# Patient Record
Sex: MALE | Race: WHITE | Employment: OTHER | URBAN - METROPOLITAN AREA
[De-identification: names, ages, dates, MRNs, and addresses within clinical notes are randomized per-mention and may not be internally consistent; named-entity substitution may affect disease eponyms.]

---

## 2018-02-12 ENCOUNTER — APPOINTMENT (OUTPATIENT)
Dept: CT IMAGING | Age: 53
DRG: 419 | End: 2018-02-12
Payer: MEDICARE

## 2018-02-12 ENCOUNTER — APPOINTMENT (OUTPATIENT)
Dept: GENERAL RADIOLOGY | Age: 53
DRG: 419 | End: 2018-02-12
Payer: MEDICARE

## 2018-02-12 ENCOUNTER — APPOINTMENT (OUTPATIENT)
Dept: ULTRASOUND IMAGING | Age: 53
DRG: 419 | End: 2018-02-12
Payer: MEDICARE

## 2018-02-12 ENCOUNTER — HOSPITAL ENCOUNTER (INPATIENT)
Age: 53
LOS: 2 days | Discharge: HOME | DRG: 419 | End: 2018-02-14
Attending: EMERGENCY MEDICINE | Admitting: INTERNAL MEDICINE
Payer: MEDICARE

## 2018-02-12 ENCOUNTER — APPOINTMENT (OUTPATIENT)
Dept: NUCLEAR MEDICINE | Age: 53
DRG: 419 | End: 2018-02-12
Payer: MEDICARE

## 2018-02-12 DIAGNOSIS — Z90.49 S/P CHOLE: ICD-10-CM

## 2018-02-12 DIAGNOSIS — R07.9 CHEST PAIN, UNSPECIFIED TYPE: Primary | ICD-10-CM

## 2018-02-12 DIAGNOSIS — K80.20 GALLSTONES: ICD-10-CM

## 2018-02-12 DIAGNOSIS — K85.91 ACUTE PANCREATITIS WITH UNINFECTED NECROSIS, UNSPECIFIED PANCREATITIS TYPE: ICD-10-CM

## 2018-02-12 PROBLEM — Z95.0 PACEMAKER: Status: ACTIVE | Noted: 2018-02-12

## 2018-02-12 PROBLEM — I10 HYPERTENSION: Status: ACTIVE | Noted: 2018-02-12

## 2018-02-12 PROBLEM — R10.9 ABDOMINAL PAIN: Status: ACTIVE | Noted: 2018-02-12

## 2018-02-12 LAB
ABSOLUTE EOS #: 0.1 K/UL (ref 0–0.4)
ABSOLUTE IMMATURE GRANULOCYTE: ABNORMAL K/UL (ref 0–0.3)
ABSOLUTE LYMPH #: 1.1 K/UL (ref 1–4.8)
ABSOLUTE MONO #: 0.2 K/UL (ref 0.1–1.3)
ALBUMIN SERPL-MCNC: 4.4 G/DL (ref 3.5–5.2)
ALBUMIN/GLOBULIN RATIO: ABNORMAL (ref 1–2.5)
ALP BLD-CCNC: 99 U/L (ref 40–129)
ALT SERPL-CCNC: 29 U/L (ref 5–41)
ANION GAP SERPL CALCULATED.3IONS-SCNC: 12 MMOL/L (ref 9–17)
AST SERPL-CCNC: 24 U/L
BASOPHILS # BLD: 1 % (ref 0–2)
BASOPHILS ABSOLUTE: 0.1 K/UL (ref 0–0.2)
BILIRUB SERPL-MCNC: 0.34 MG/DL (ref 0.3–1.2)
BUN BLDV-MCNC: 12 MG/DL (ref 6–20)
BUN/CREAT BLD: ABNORMAL (ref 9–20)
CALCIUM SERPL-MCNC: 9.2 MG/DL (ref 8.6–10.4)
CHLORIDE BLD-SCNC: 102 MMOL/L (ref 98–107)
CO2: 26 MMOL/L (ref 20–31)
CREAT SERPL-MCNC: 1.01 MG/DL (ref 0.7–1.2)
DIFFERENTIAL TYPE: ABNORMAL
EKG ATRIAL RATE: 74 BPM
EKG P AXIS: 42 DEGREES
EKG P-R INTERVAL: 176 MS
EKG Q-T INTERVAL: 430 MS
EKG QRS DURATION: 98 MS
EKG QTC CALCULATION (BAZETT): 477 MS
EKG R AXIS: 55 DEGREES
EKG T AXIS: 40 DEGREES
EKG VENTRICULAR RATE: 74 BPM
EOSINOPHILS RELATIVE PERCENT: 1 % (ref 0–4)
ESTIMATED AVERAGE GLUCOSE: 97 MG/DL
GFR AFRICAN AMERICAN: >60 ML/MIN
GFR NON-AFRICAN AMERICAN: >60 ML/MIN
GFR SERPL CREATININE-BSD FRML MDRD: ABNORMAL ML/MIN/{1.73_M2}
GFR SERPL CREATININE-BSD FRML MDRD: ABNORMAL ML/MIN/{1.73_M2}
GLUCOSE BLD-MCNC: 182 MG/DL (ref 70–99)
HBA1C MFR BLD: 5 % (ref 4–6)
HCT VFR BLD CALC: 46.9 % (ref 41–53)
HEMOGLOBIN: 15.7 G/DL (ref 13.5–17.5)
IMMATURE GRANULOCYTES: ABNORMAL %
INR BLD: 1
LIPASE: 103 U/L (ref 13–60)
LV EF: 55 %
LVEF MODALITY: NORMAL
LYMPHOCYTES # BLD: 15 % (ref 24–44)
MAGNESIUM: 2 MG/DL (ref 1.6–2.6)
MCH RBC QN AUTO: 31.2 PG (ref 26–34)
MCHC RBC AUTO-ENTMCNC: 33.6 G/DL (ref 31–37)
MCV RBC AUTO: 92.9 FL (ref 80–100)
MONOCYTES # BLD: 3 % (ref 1–7)
NRBC AUTOMATED: ABNORMAL PER 100 WBC
PDW BLD-RTO: 13 % (ref 11.5–14.9)
PLATELET # BLD: 245 K/UL (ref 150–450)
PLATELET ESTIMATE: ABNORMAL
PMV BLD AUTO: 8 FL (ref 6–12)
POTASSIUM SERPL-SCNC: 4.2 MMOL/L (ref 3.7–5.3)
PROTHROMBIN TIME: 10.5 SEC (ref 9.7–12)
RBC # BLD: 5.04 M/UL (ref 4.5–5.9)
RBC # BLD: ABNORMAL 10*6/UL
SEG NEUTROPHILS: 80 % (ref 36–66)
SEGMENTED NEUTROPHILS ABSOLUTE COUNT: 5.8 K/UL (ref 1.3–9.1)
SODIUM BLD-SCNC: 140 MMOL/L (ref 135–144)
TOTAL PROTEIN: 7.7 G/DL (ref 6.4–8.3)
TROPONIN INTERP: NORMAL
TROPONIN T: <0.03 NG/ML
WBC # BLD: 7.3 K/UL (ref 3.5–11)
WBC # BLD: ABNORMAL 10*3/UL

## 2018-02-12 PROCEDURE — A9537 TC99M MEBROFENIN: HCPCS | Performed by: EMERGENCY MEDICINE

## 2018-02-12 PROCEDURE — 3430000000 HC RX DIAGNOSTIC RADIOPHARMACEUTICAL: Performed by: EMERGENCY MEDICINE

## 2018-02-12 PROCEDURE — 99285 EMERGENCY DEPT VISIT HI MDM: CPT

## 2018-02-12 PROCEDURE — 93005 ELECTROCARDIOGRAM TRACING: CPT

## 2018-02-12 PROCEDURE — 85025 COMPLETE CBC W/AUTO DIFF WBC: CPT

## 2018-02-12 PROCEDURE — G0378 HOSPITAL OBSERVATION PER HR: HCPCS

## 2018-02-12 PROCEDURE — 2060000000 HC ICU INTERMEDIATE R&B

## 2018-02-12 PROCEDURE — 2500000003 HC RX 250 WO HCPCS: Performed by: INTERNAL MEDICINE

## 2018-02-12 PROCEDURE — 96374 THER/PROPH/DIAG INJ IV PUSH: CPT

## 2018-02-12 PROCEDURE — 85610 PROTHROMBIN TIME: CPT

## 2018-02-12 PROCEDURE — 2580000003 HC RX 258: Performed by: SURGERY

## 2018-02-12 PROCEDURE — 2580000003 HC RX 258: Performed by: EMERGENCY MEDICINE

## 2018-02-12 PROCEDURE — 6360000002 HC RX W HCPCS: Performed by: SURGERY

## 2018-02-12 PROCEDURE — 83690 ASSAY OF LIPASE: CPT

## 2018-02-12 PROCEDURE — 83036 HEMOGLOBIN GLYCOSYLATED A1C: CPT

## 2018-02-12 PROCEDURE — 6360000002 HC RX W HCPCS: Performed by: EMERGENCY MEDICINE

## 2018-02-12 PROCEDURE — 2500000003 HC RX 250 WO HCPCS: Performed by: EMERGENCY MEDICINE

## 2018-02-12 PROCEDURE — 93306 TTE W/DOPPLER COMPLETE: CPT

## 2018-02-12 PROCEDURE — 6360000004 HC RX CONTRAST MEDICATION: Performed by: EMERGENCY MEDICINE

## 2018-02-12 PROCEDURE — 6360000002 HC RX W HCPCS: Performed by: STUDENT IN AN ORGANIZED HEALTH CARE EDUCATION/TRAINING PROGRAM

## 2018-02-12 PROCEDURE — 6360000002 HC RX W HCPCS: Performed by: RADIOLOGY

## 2018-02-12 PROCEDURE — 96375 TX/PRO/DX INJ NEW DRUG ADDON: CPT

## 2018-02-12 PROCEDURE — 74022 RADEX COMPL AQT ABD SERIES: CPT

## 2018-02-12 PROCEDURE — 74174 CTA ABD&PLVS W/CONTRAST: CPT

## 2018-02-12 PROCEDURE — 76705 ECHO EXAM OF ABDOMEN: CPT

## 2018-02-12 PROCEDURE — 71275 CT ANGIOGRAPHY CHEST: CPT

## 2018-02-12 PROCEDURE — 99223 1ST HOSP IP/OBS HIGH 75: CPT | Performed by: INTERNAL MEDICINE

## 2018-02-12 PROCEDURE — 78226 HEPATOBILIARY SYSTEM IMAGING: CPT

## 2018-02-12 PROCEDURE — 83735 ASSAY OF MAGNESIUM: CPT

## 2018-02-12 PROCEDURE — 80053 COMPREHEN METABOLIC PANEL: CPT

## 2018-02-12 PROCEDURE — 2580000003 HC RX 258: Performed by: STUDENT IN AN ORGANIZED HEALTH CARE EDUCATION/TRAINING PROGRAM

## 2018-02-12 PROCEDURE — S0028 INJECTION, FAMOTIDINE, 20 MG: HCPCS | Performed by: EMERGENCY MEDICINE

## 2018-02-12 PROCEDURE — 36415 COLL VENOUS BLD VENIPUNCTURE: CPT

## 2018-02-12 PROCEDURE — 84484 ASSAY OF TROPONIN QUANT: CPT

## 2018-02-12 RX ORDER — METOPROLOL TARTRATE 5 MG/5ML
5 INJECTION INTRAVENOUS EVERY 6 HOURS
Status: DISCONTINUED | OUTPATIENT
Start: 2018-02-12 | End: 2018-02-14

## 2018-02-12 RX ORDER — SODIUM CHLORIDE 0.9 % (FLUSH) 0.9 %
10 SYRINGE (ML) INJECTION PRN
Status: DISCONTINUED | OUTPATIENT
Start: 2018-02-12 | End: 2018-02-13 | Stop reason: SDUPTHER

## 2018-02-12 RX ORDER — M-VIT,TX,IRON,MINS/CALC/FOLIC 27MG-0.4MG
1 TABLET ORAL DAILY
COMMUNITY

## 2018-02-12 RX ORDER — SODIUM CHLORIDE 0.9 % (FLUSH) 0.9 %
10 SYRINGE (ML) INJECTION PRN
Status: DISCONTINUED | OUTPATIENT
Start: 2018-02-12 | End: 2018-02-14 | Stop reason: HOSPADM

## 2018-02-12 RX ORDER — ASPIRIN 81 MG/1
81 TABLET, CHEWABLE ORAL DAILY
Status: DISCONTINUED | OUTPATIENT
Start: 2018-02-13 | End: 2018-02-12

## 2018-02-12 RX ORDER — SODIUM CHLORIDE 0.9 % (FLUSH) 0.9 %
10 SYRINGE (ML) INJECTION EVERY 12 HOURS SCHEDULED
Status: DISCONTINUED | OUTPATIENT
Start: 2018-02-12 | End: 2018-02-14 | Stop reason: HOSPADM

## 2018-02-12 RX ORDER — ACETAMINOPHEN 325 MG/1
650 TABLET ORAL EVERY 4 HOURS PRN
Status: DISCONTINUED | OUTPATIENT
Start: 2018-02-12 | End: 2018-02-14 | Stop reason: HOSPADM

## 2018-02-12 RX ORDER — ONDANSETRON 2 MG/ML
8 INJECTION INTRAMUSCULAR; INTRAVENOUS ONCE
Status: COMPLETED | OUTPATIENT
Start: 2018-02-12 | End: 2018-02-12

## 2018-02-12 RX ORDER — MORPHINE SULFATE 4 MG/ML
4 INJECTION, SOLUTION INTRAMUSCULAR; INTRAVENOUS ONCE
Status: COMPLETED | OUTPATIENT
Start: 2018-02-12 | End: 2018-02-12

## 2018-02-12 RX ORDER — FENTANYL CITRATE 50 UG/ML
50 INJECTION, SOLUTION INTRAMUSCULAR; INTRAVENOUS ONCE
Status: COMPLETED | OUTPATIENT
Start: 2018-02-12 | End: 2018-02-12

## 2018-02-12 RX ORDER — 0.9 % SODIUM CHLORIDE 0.9 %
100 INTRAVENOUS SOLUTION INTRAVENOUS ONCE
Status: COMPLETED | OUTPATIENT
Start: 2018-02-12 | End: 2018-02-12

## 2018-02-12 RX ORDER — ONDANSETRON 2 MG/ML
4 INJECTION INTRAMUSCULAR; INTRAVENOUS EVERY 6 HOURS PRN
Status: DISCONTINUED | OUTPATIENT
Start: 2018-02-12 | End: 2018-02-13 | Stop reason: SDUPTHER

## 2018-02-12 RX ORDER — ACETAMINOPHEN 500 MG
1000 TABLET ORAL ONCE
Status: DISCONTINUED | OUTPATIENT
Start: 2018-02-12 | End: 2018-02-12

## 2018-02-12 RX ADMIN — MORPHINE SULFATE 4 MG: 4 INJECTION, SOLUTION INTRAMUSCULAR; INTRAVENOUS at 14:42

## 2018-02-12 RX ADMIN — ENOXAPARIN SODIUM 30 MG: 30 INJECTION SUBCUTANEOUS at 17:01

## 2018-02-12 RX ADMIN — FENTANYL CITRATE 50 MCG: 50 INJECTION, SOLUTION INTRAMUSCULAR; INTRAVENOUS at 10:18

## 2018-02-12 RX ADMIN — METOPROLOL TARTRATE 5 MG: 5 INJECTION, SOLUTION INTRAVENOUS at 23:29

## 2018-02-12 RX ADMIN — ONDANSETRON 8 MG: 2 INJECTION INTRAMUSCULAR; INTRAVENOUS at 09:41

## 2018-02-12 RX ADMIN — PIPERACILLIN SODIUM,TAZOBACTAM SODIUM 3.38 G: 3; .375 INJECTION, POWDER, FOR SOLUTION INTRAVENOUS at 20:16

## 2018-02-12 RX ADMIN — IOPAMIDOL 100 ML: 755 INJECTION, SOLUTION INTRAVENOUS at 09:09

## 2018-02-12 RX ADMIN — Medication 10 ML: at 09:09

## 2018-02-12 RX ADMIN — Medication 4.8 MILLICURIE: at 13:20

## 2018-02-12 RX ADMIN — METOPROLOL TARTRATE 5 MG: 5 INJECTION, SOLUTION INTRAVENOUS at 17:00

## 2018-02-12 RX ADMIN — FAMOTIDINE 20 MG: 10 INJECTION, SOLUTION INTRAVENOUS at 09:41

## 2018-02-12 RX ADMIN — Medication 10 ML: at 17:01

## 2018-02-12 RX ADMIN — Medication 10 ML: at 14:40

## 2018-02-12 RX ADMIN — Medication 2.1 MILLICURIE: at 14:40

## 2018-02-12 RX ADMIN — SODIUM CHLORIDE 100 ML: 9 INJECTION, SOLUTION INTRAVENOUS at 09:09

## 2018-02-12 RX ADMIN — Medication 10 ML: at 20:17

## 2018-02-12 RX ADMIN — Medication 10 ML: at 13:20

## 2018-02-12 ASSESSMENT — ENCOUNTER SYMPTOMS
APNEA: 0
COUGH: 1
BLOOD IN STOOL: 0
CONSTIPATION: 0
ABDOMINAL PAIN: 0
ABDOMINAL PAIN: 1
CHOKING: 0
COUGH: 0
STRIDOR: 0
SHORTNESS OF BREATH: 0
ORTHOPNEA: 0
CHEST TIGHTNESS: 0
DIARRHEA: 0
SPUTUM PRODUCTION: 1
VOMITING: 1
WHEEZING: 0
NAUSEA: 1

## 2018-02-12 ASSESSMENT — PAIN DESCRIPTION - LOCATION: LOCATION: CHEST

## 2018-02-12 ASSESSMENT — PAIN SCALES - GENERAL
PAINLEVEL_OUTOF10: 7
PAINLEVEL_OUTOF10: 4
PAINLEVEL_OUTOF10: 4

## 2018-02-12 NOTE — CONSULTS
[facility])            Consult        Date of Admission:  2/12/2018  Date of Consultation:  2/12/2018      PCP:  Ish Lennon CNP      Reason for the consult:  Chest pain and cardiac risk assessment prior to cholecystectomy    History of Present Illness:  Ruddy Duffy is a 45 y.o. male  with history of mitral valve disease status post mitral valve replacement. Status post permanent pacemaker. History of hypertension. Patient have a follow-up with our office since 2015 he have not had any pacemaker check R echocardiogram recently. He was admitted with epigastric and lower chest pains he describes it as sharp for his assistant improved with morphine relation to any exertion no radiation. PMH:   has a past medical history of Hypertension and Sepsis ([address] PSH:   has a past surgical history that includes Cardiac pacemaker placement and Cardiac valve replacement. Allergies: Allergies   Allergen Reactions    Compazine [Prochlorperazine Maleate] Other (See Comments)     Unknown reaction. Positive reaction during allergy scratch testing.          Home Meds:    Prior to Admission medications    Not on File        Hospital Meds:    Current Facility-Administered Medications   Medication Dose Route Frequency Provider Last Rate Last Dose    sodium chloride flush 0.9 % injection 10 mL  10 mL Intravenous PRN Jeannie Melendez MD   10 mL at 02/12/18 0909    sodium chloride flush 0.9 % injection 10 mL  10 mL Intravenous 2 times per day Luis Armando Stern MD        sodium chloride flush 0.9 % injection 10 mL  10 mL Intravenous PRN Luis Armando Stern MD        acetaminophen (TYLENOL) tablet 650 mg  650 mg Oral Q4H PRN Luis Armando Stern MD        magnesium hydroxide (MILK OF MAGNESIA) 400 MG/5ML suspension 30 mL  30 mL Oral Daily PRN Luis Armando Stern MD        enoxaparin (LOVENOX) injection 30 mg  30 mg Subcutaneous BID Luis Armando Stern MD        ondansetron [facility]) injection 4 mg  4 mg Intravenous

## 2018-02-12 NOTE — H&P
for 4 hours FINDINGS: LIVER:  Diffuse heterogeneous increased echogenicity throughout the hepatic parenchyma which most likely represents diffuse fatty infiltration of the liver. There is probable focal fatty sparing adjacent to the gallbladder in the gallbladder fossa on image 12. BILIARY SYSTEM:  Gallbladder is unremarkable without evidence of pericholecystic fluid, wall thickening or stones. Negative sonographic Gallardo's sign. Gallbladder wall thickness is normal measuring 2 mm. Common bile duct is within normal limits measuring up to 5 mm. RIGHT KIDNEY: Partial visualization of the right kidney demonstrates no gross right-sided hydronephrosis. PANCREAS:  Pancreas not well visualized. OTHER: No evidence of right upper quadrant ascites. 1. Diffuse fatty infiltration of the liver with probable focal fatty sparing adjacent to the gallbladder in the gallbladder fossa. 2. Otherwise, unremarkable right upper quadrant abdominal ultrasound. Cta Chest W Contrast  Cta Abdomen Pelvis W Contrast  Result Date: 2/12/2018  EXAMINATION: CTA OF THE CHEST; CTA OF THE ABDOMEN AND PELVIS WITH AND WITHOUT CONTRAST 2/12/2018 9:13 am TECHNIQUE: CTA of the chest was performed after the administration of intravenous contrast.  Multiplanar reformatted images are provided for review. MIP images are provided for review. Dose modulation, iterative reconstruction, and/or weight based adjustment of the mA/kV was utilized to reduce the radiation dose to as low as reasonably achievable.; CTA of the abdomen and pelvis was performed without and with the administration of intravenous contrast. Multiplanar reformatted images are provided for review. MIP images are provided for review. Dose modulation, iterative reconstruction, and/or weight based adjustment of the mA/kV was utilized to reduce the radiation dose to as low as reasonably achievable. 3D reconstructed images were performed on a separate workstation and provided for review. Problem  Chest pain    Active Hospital Problems    Diagnosis Date Noted    Chest pain [R07.9] 02/12/2018    Hypertension [I10] 02/12/2018    Pacemaker [Z95.0] 02/12/2018    Abdominal pain [R10.9] 02/12/2018    Symptomatic cholelithiasis [K80.20] 02/12/2018     Plan:     Patient status Admit as inpatient in the  Progressive Unit/Step down    Chest pain - atypical likely related to gallstones  - Trop neg x 2 ; trend  - ASA , BB  - consider cardiology consult if unresolved  - f/u hgb A1c, lipid panel to assess risk  - consider stress test   - hx of prosthetic valve, PM  - continue to monitor     Symptomatic cholelithiasis / Abdominal Pain  - f/u HIDA  - CT showing 1.7cm stone at neck of gallbladder with dilation  - consult to general surgery - await recs  - NPO, ice chips     Hypertension  - patient reports poor medicaiton compliance  - start BB  - continue to monitor BP    DVT prophylaxis: Lovenox 40 mg SC  GI prophylaxis: reason for no GI prophylaxis: defer    Consultations:   IP CONSULT TO GENERAL SURGERY  IP CONSULT TO SOCIAL WORK    Patient is admitted as inpatient status because of co-morbidities listed above, severity of signs and symptoms as outlined, requirement for current medical therapies and most importantly because of direct risk to patient if care not provided in a hospital setting. Michelet Torres MD  2/12/2018  11:52 AM    Copy sent to Dr. Linwood Hamlin CNP      IM Attending    Pt seen and examined today   I have discussed the care of pt , including pertinent history and exam findings,  with the resident. I have reviewed the key elements of all parts of the encounter with the resident. I agree with the assessment, plan and orders as documented by the resident.     Acute cholecystitis   Sx consult    Electronically signed by Les Brown MD on 2/12/2018 at 11:33 PM

## 2018-02-13 ENCOUNTER — ANESTHESIA (OUTPATIENT)
Dept: OPERATING ROOM | Age: 53
DRG: 419 | End: 2018-02-13
Payer: MEDICARE

## 2018-02-13 ENCOUNTER — ANESTHESIA EVENT (OUTPATIENT)
Dept: OPERATING ROOM | Age: 53
DRG: 419 | End: 2018-02-13
Payer: MEDICARE

## 2018-02-13 VITALS — TEMPERATURE: 97.7 F | DIASTOLIC BLOOD PRESSURE: 73 MMHG | SYSTOLIC BLOOD PRESSURE: 123 MMHG | OXYGEN SATURATION: 95 %

## 2018-02-13 LAB
ABSOLUTE EOS #: 0.2 K/UL (ref 0–0.4)
ABSOLUTE IMMATURE GRANULOCYTE: ABNORMAL K/UL (ref 0–0.3)
ABSOLUTE LYMPH #: 1.4 K/UL (ref 1–4.8)
ABSOLUTE MONO #: 0.5 K/UL (ref 0.1–1.3)
ALBUMIN SERPL-MCNC: 3.8 G/DL (ref 3.5–5.2)
ALBUMIN/GLOBULIN RATIO: NORMAL (ref 1–2.5)
ALP BLD-CCNC: 79 U/L (ref 40–129)
ALT SERPL-CCNC: 24 U/L (ref 5–41)
ANION GAP SERPL CALCULATED.3IONS-SCNC: 8 MMOL/L (ref 9–17)
AST SERPL-CCNC: 21 U/L
BASOPHILS # BLD: 1 % (ref 0–2)
BASOPHILS ABSOLUTE: 0 K/UL (ref 0–0.2)
BILIRUB SERPL-MCNC: 0.68 MG/DL (ref 0.3–1.2)
BILIRUBIN DIRECT: 0.18 MG/DL
BILIRUBIN, INDIRECT: 0.5 MG/DL (ref 0–1)
BUN BLDV-MCNC: 13 MG/DL (ref 6–20)
BUN/CREAT BLD: ABNORMAL (ref 9–20)
CALCIUM SERPL-MCNC: 8.8 MG/DL (ref 8.6–10.4)
CHLORIDE BLD-SCNC: 101 MMOL/L (ref 98–107)
CHOLESTEROL/HDL RATIO: 5.9
CHOLESTEROL: 190 MG/DL
CO2: 32 MMOL/L (ref 20–31)
CREAT SERPL-MCNC: 1.22 MG/DL (ref 0.7–1.2)
DIFFERENTIAL TYPE: ABNORMAL
EOSINOPHILS RELATIVE PERCENT: 3 % (ref 0–4)
GFR AFRICAN AMERICAN: >60 ML/MIN
GFR NON-AFRICAN AMERICAN: >60 ML/MIN
GFR SERPL CREATININE-BSD FRML MDRD: ABNORMAL ML/MIN/{1.73_M2}
GFR SERPL CREATININE-BSD FRML MDRD: ABNORMAL ML/MIN/{1.73_M2}
GLOBULIN: NORMAL G/DL (ref 1.5–3.8)
GLUCOSE BLD-MCNC: 104 MG/DL (ref 70–99)
HCT VFR BLD CALC: 41.9 % (ref 41–53)
HDLC SERPL-MCNC: 32 MG/DL
HEMOGLOBIN: 14.4 G/DL (ref 13.5–17.5)
IMMATURE GRANULOCYTES: ABNORMAL %
LDL CHOLESTEROL: 119 MG/DL (ref 0–130)
LIPASE: 35 U/L (ref 13–60)
LYMPHOCYTES # BLD: 20 % (ref 24–44)
MAGNESIUM: 1.9 MG/DL (ref 1.6–2.6)
MCH RBC QN AUTO: 31.9 PG (ref 26–34)
MCHC RBC AUTO-ENTMCNC: 34.4 G/DL (ref 31–37)
MCV RBC AUTO: 92.8 FL (ref 80–100)
MONOCYTES # BLD: 8 % (ref 1–7)
NRBC AUTOMATED: ABNORMAL PER 100 WBC
PDW BLD-RTO: 12.9 % (ref 11.5–14.9)
PLATELET # BLD: 212 K/UL (ref 150–450)
PLATELET ESTIMATE: ABNORMAL
PMV BLD AUTO: 8.2 FL (ref 6–12)
POTASSIUM SERPL-SCNC: 4 MMOL/L (ref 3.7–5.3)
RBC # BLD: 4.51 M/UL (ref 4.5–5.9)
RBC # BLD: ABNORMAL 10*6/UL
SEG NEUTROPHILS: 68 % (ref 36–66)
SEGMENTED NEUTROPHILS ABSOLUTE COUNT: 4.9 K/UL (ref 1.3–9.1)
SODIUM BLD-SCNC: 141 MMOL/L (ref 135–144)
TOTAL PROTEIN: 6.6 G/DL (ref 6.4–8.3)
TRIGL SERPL-MCNC: 197 MG/DL
VLDLC SERPL CALC-MCNC: ABNORMAL MG/DL (ref 1–30)
WBC # BLD: 7.2 K/UL (ref 3.5–11)
WBC # BLD: ABNORMAL 10*3/UL

## 2018-02-13 PROCEDURE — 83690 ASSAY OF LIPASE: CPT

## 2018-02-13 PROCEDURE — 6360000002 HC RX W HCPCS: Performed by: INTERNAL MEDICINE

## 2018-02-13 PROCEDURE — 2500000003 HC RX 250 WO HCPCS: Performed by: ANESTHESIOLOGY

## 2018-02-13 PROCEDURE — 3700000000 HC ANESTHESIA ATTENDED CARE: Performed by: SURGERY

## 2018-02-13 PROCEDURE — 2580000003 HC RX 258: Performed by: ANESTHESIOLOGY

## 2018-02-13 PROCEDURE — 88304 TISSUE EXAM BY PATHOLOGIST: CPT

## 2018-02-13 PROCEDURE — 2500000003 HC RX 250 WO HCPCS: Performed by: SURGERY

## 2018-02-13 PROCEDURE — A6402 STERILE GAUZE <= 16 SQ IN: HCPCS | Performed by: SURGERY

## 2018-02-13 PROCEDURE — 36415 COLL VENOUS BLD VENIPUNCTURE: CPT

## 2018-02-13 PROCEDURE — 85025 COMPLETE CBC W/AUTO DIFF WBC: CPT

## 2018-02-13 PROCEDURE — 80076 HEPATIC FUNCTION PANEL: CPT

## 2018-02-13 PROCEDURE — 0FT44ZZ RESECTION OF GALLBLADDER, PERCUTANEOUS ENDOSCOPIC APPROACH: ICD-10-PCS | Performed by: SURGERY

## 2018-02-13 PROCEDURE — 7100000000 HC PACU RECOVERY - FIRST 15 MIN: Performed by: SURGERY

## 2018-02-13 PROCEDURE — 6370000000 HC RX 637 (ALT 250 FOR IP): Performed by: STUDENT IN AN ORGANIZED HEALTH CARE EDUCATION/TRAINING PROGRAM

## 2018-02-13 PROCEDURE — 83735 ASSAY OF MAGNESIUM: CPT

## 2018-02-13 PROCEDURE — 3600000019 HC SURGERY ROBOT ADDTL 15MIN: Performed by: SURGERY

## 2018-02-13 PROCEDURE — 6360000002 HC RX W HCPCS: Performed by: SURGERY

## 2018-02-13 PROCEDURE — 6360000002 HC RX W HCPCS: Performed by: STUDENT IN AN ORGANIZED HEALTH CARE EDUCATION/TRAINING PROGRAM

## 2018-02-13 PROCEDURE — 2500000003 HC RX 250 WO HCPCS: Performed by: INTERNAL MEDICINE

## 2018-02-13 PROCEDURE — 3700000001 HC ADD 15 MINUTES (ANESTHESIA): Performed by: SURGERY

## 2018-02-13 PROCEDURE — 2580000003 HC RX 258: Performed by: SURGERY

## 2018-02-13 PROCEDURE — S2900 ROBOTIC SURGICAL SYSTEM: HCPCS | Performed by: SURGERY

## 2018-02-13 PROCEDURE — 7100000001 HC PACU RECOVERY - ADDTL 15 MIN: Performed by: SURGERY

## 2018-02-13 PROCEDURE — 2060000000 HC ICU INTERMEDIATE R&B

## 2018-02-13 PROCEDURE — 2580000003 HC RX 258: Performed by: STUDENT IN AN ORGANIZED HEALTH CARE EDUCATION/TRAINING PROGRAM

## 2018-02-13 PROCEDURE — 6360000002 HC RX W HCPCS: Performed by: ANESTHESIOLOGY

## 2018-02-13 PROCEDURE — 99233 SBSQ HOSP IP/OBS HIGH 50: CPT | Performed by: INTERNAL MEDICINE

## 2018-02-13 PROCEDURE — 80061 LIPID PANEL: CPT

## 2018-02-13 PROCEDURE — 3600000009 HC SURGERY ROBOT BASE: Performed by: SURGERY

## 2018-02-13 PROCEDURE — 8E0W4CZ ROBOTIC ASSISTED PROCEDURE OF TRUNK REGION, PERCUTANEOUS ENDOSCOPIC APPROACH: ICD-10-PCS | Performed by: SURGERY

## 2018-02-13 PROCEDURE — 80048 BASIC METABOLIC PNL TOTAL CA: CPT

## 2018-02-13 RX ORDER — FENTANYL CITRATE 50 UG/ML
25 INJECTION, SOLUTION INTRAMUSCULAR; INTRAVENOUS
Status: DISCONTINUED | OUTPATIENT
Start: 2018-02-13 | End: 2018-02-13 | Stop reason: SDUPTHER

## 2018-02-13 RX ORDER — FENTANYL CITRATE 50 UG/ML
25 INJECTION, SOLUTION INTRAMUSCULAR; INTRAVENOUS EVERY 5 MIN PRN
Status: DISCONTINUED | OUTPATIENT
Start: 2018-02-13 | End: 2018-02-13 | Stop reason: HOSPADM

## 2018-02-13 RX ORDER — EPHEDRINE SULFATE 50 MG/ML
INJECTION, SOLUTION INTRAVENOUS PRN
Status: DISCONTINUED | OUTPATIENT
Start: 2018-02-13 | End: 2018-02-13 | Stop reason: SDUPTHER

## 2018-02-13 RX ORDER — BUPIVACAINE HYDROCHLORIDE 2.5 MG/ML
INJECTION, SOLUTION EPIDURAL; INFILTRATION; INTRACAUDAL PRN
Status: DISCONTINUED | OUTPATIENT
Start: 2018-02-13 | End: 2018-02-13 | Stop reason: HOSPADM

## 2018-02-13 RX ORDER — FENTANYL CITRATE 50 UG/ML
100 INJECTION, SOLUTION INTRAMUSCULAR; INTRAVENOUS
Status: DISCONTINUED | OUTPATIENT
Start: 2018-02-13 | End: 2018-02-14 | Stop reason: HOSPADM

## 2018-02-13 RX ORDER — MORPHINE SULFATE 2 MG/ML
2 INJECTION, SOLUTION INTRAMUSCULAR; INTRAVENOUS EVERY 5 MIN PRN
Status: DISCONTINUED | OUTPATIENT
Start: 2018-02-13 | End: 2018-02-13 | Stop reason: HOSPADM

## 2018-02-13 RX ORDER — HYDRALAZINE HYDROCHLORIDE 20 MG/ML
5 INJECTION INTRAMUSCULAR; INTRAVENOUS EVERY 10 MIN PRN
Status: DISCONTINUED | OUTPATIENT
Start: 2018-02-13 | End: 2018-02-13 | Stop reason: HOSPADM

## 2018-02-13 RX ORDER — DEXAMETHASONE SODIUM PHOSPHATE 4 MG/ML
INJECTION, SOLUTION INTRA-ARTICULAR; INTRALESIONAL; INTRAMUSCULAR; INTRAVENOUS; SOFT TISSUE PRN
Status: DISCONTINUED | OUTPATIENT
Start: 2018-02-13 | End: 2018-02-13 | Stop reason: SDUPTHER

## 2018-02-13 RX ORDER — MIDAZOLAM HYDROCHLORIDE 1 MG/ML
INJECTION INTRAMUSCULAR; INTRAVENOUS PRN
Status: DISCONTINUED | OUTPATIENT
Start: 2018-02-13 | End: 2018-02-13 | Stop reason: SDUPTHER

## 2018-02-13 RX ORDER — FENTANYL CITRATE 50 UG/ML
25 INJECTION, SOLUTION INTRAMUSCULAR; INTRAVENOUS
Status: DISCONTINUED | OUTPATIENT
Start: 2018-02-13 | End: 2018-02-13

## 2018-02-13 RX ORDER — HYDROCODONE BITARTRATE AND ACETAMINOPHEN 5; 325 MG/1; MG/1
1 TABLET ORAL
Status: DISCONTINUED | OUTPATIENT
Start: 2018-02-13 | End: 2018-02-13 | Stop reason: HOSPADM

## 2018-02-13 RX ORDER — SODIUM CHLORIDE 9 MG/ML
INJECTION, SOLUTION INTRAVENOUS CONTINUOUS PRN
Status: DISCONTINUED | OUTPATIENT
Start: 2018-02-13 | End: 2018-02-13 | Stop reason: SDUPTHER

## 2018-02-13 RX ORDER — MEPERIDINE HYDROCHLORIDE 50 MG/ML
12.5 INJECTION INTRAMUSCULAR; INTRAVENOUS; SUBCUTANEOUS EVERY 5 MIN PRN
Status: DISCONTINUED | OUTPATIENT
Start: 2018-02-13 | End: 2018-02-13 | Stop reason: HOSPADM

## 2018-02-13 RX ORDER — LIDOCAINE HYDROCHLORIDE 20 MG/ML
INJECTION, SOLUTION INFILTRATION; PERINEURAL PRN
Status: DISCONTINUED | OUTPATIENT
Start: 2018-02-13 | End: 2018-02-13 | Stop reason: SDUPTHER

## 2018-02-13 RX ORDER — PROPOFOL 10 MG/ML
INJECTION, EMULSION INTRAVENOUS PRN
Status: DISCONTINUED | OUTPATIENT
Start: 2018-02-13 | End: 2018-02-13 | Stop reason: SDUPTHER

## 2018-02-13 RX ORDER — NEOSTIGMINE METHYLSULFATE 5 MG/5 ML
SYRINGE (ML) INTRAVENOUS PRN
Status: DISCONTINUED | OUTPATIENT
Start: 2018-02-13 | End: 2018-02-13 | Stop reason: SDUPTHER

## 2018-02-13 RX ORDER — ATORVASTATIN CALCIUM 40 MG/1
40 TABLET, FILM COATED ORAL NIGHTLY
Status: DISCONTINUED | OUTPATIENT
Start: 2018-02-13 | End: 2018-02-14 | Stop reason: HOSPADM

## 2018-02-13 RX ORDER — ONDANSETRON 2 MG/ML
4 INJECTION INTRAMUSCULAR; INTRAVENOUS EVERY 6 HOURS PRN
Status: DISCONTINUED | OUTPATIENT
Start: 2018-02-13 | End: 2018-02-14 | Stop reason: HOSPADM

## 2018-02-13 RX ORDER — ROCURONIUM BROMIDE 10 MG/ML
INJECTION, SOLUTION INTRAVENOUS PRN
Status: DISCONTINUED | OUTPATIENT
Start: 2018-02-13 | End: 2018-02-13 | Stop reason: SDUPTHER

## 2018-02-13 RX ORDER — 0.9 % SODIUM CHLORIDE 0.9 %
500 INTRAVENOUS SOLUTION INTRAVENOUS
Status: DISCONTINUED | OUTPATIENT
Start: 2018-02-13 | End: 2018-02-13 | Stop reason: HOSPADM

## 2018-02-13 RX ORDER — OXYCODONE HYDROCHLORIDE AND ACETAMINOPHEN 5; 325 MG/1; MG/1
1 TABLET ORAL EVERY 4 HOURS PRN
Status: DISCONTINUED | OUTPATIENT
Start: 2018-02-13 | End: 2018-02-14 | Stop reason: HOSPADM

## 2018-02-13 RX ORDER — OXYCODONE HYDROCHLORIDE AND ACETAMINOPHEN 5; 325 MG/1; MG/1
2 TABLET ORAL EVERY 4 HOURS PRN
Status: DISCONTINUED | OUTPATIENT
Start: 2018-02-13 | End: 2018-02-14 | Stop reason: HOSPADM

## 2018-02-13 RX ORDER — GLYCOPYRROLATE 1 MG/5 ML
SYRINGE (ML) INTRAVENOUS PRN
Status: DISCONTINUED | OUTPATIENT
Start: 2018-02-13 | End: 2018-02-13 | Stop reason: SDUPTHER

## 2018-02-13 RX ORDER — MORPHINE SULFATE 4 MG/ML
4 INJECTION, SOLUTION INTRAMUSCULAR; INTRAVENOUS ONCE
Status: COMPLETED | OUTPATIENT
Start: 2018-02-13 | End: 2018-02-13

## 2018-02-13 RX ORDER — DIPHENHYDRAMINE HYDROCHLORIDE 50 MG/ML
12.5 INJECTION INTRAMUSCULAR; INTRAVENOUS
Status: DISCONTINUED | OUTPATIENT
Start: 2018-02-13 | End: 2018-02-13 | Stop reason: HOSPADM

## 2018-02-13 RX ADMIN — ROCURONIUM BROMIDE 10 MG: 10 INJECTION INTRAVENOUS at 16:45

## 2018-02-13 RX ADMIN — METOPROLOL TARTRATE 5 MG: 5 INJECTION, SOLUTION INTRAVENOUS at 10:59

## 2018-02-13 RX ADMIN — ACETAMINOPHEN 650 MG: 325 TABLET, FILM COATED ORAL at 10:52

## 2018-02-13 RX ADMIN — FENTANYL CITRATE 50 MCG: 50 INJECTION, SOLUTION INTRAMUSCULAR; INTRAVENOUS at 16:20

## 2018-02-13 RX ADMIN — MORPHINE SULFATE 4 MG: 4 INJECTION, SOLUTION INTRAMUSCULAR; INTRAVENOUS at 06:05

## 2018-02-13 RX ADMIN — PIPERACILLIN SODIUM,TAZOBACTAM SODIUM 3.38 G: 3; .375 INJECTION, POWDER, FOR SOLUTION INTRAVENOUS at 03:38

## 2018-02-13 RX ADMIN — FENTANYL CITRATE 100 MCG: 50 INJECTION INTRAMUSCULAR; INTRAVENOUS at 19:45

## 2018-02-13 RX ADMIN — EPHEDRINE SULFATE 10 MG: 50 INJECTION INTRAMUSCULAR; INTRAVENOUS; SUBCUTANEOUS at 16:55

## 2018-02-13 RX ADMIN — Medication 3 MG: at 17:39

## 2018-02-13 RX ADMIN — MIDAZOLAM 2 MG: 1 INJECTION INTRAMUSCULAR; INTRAVENOUS at 15:57

## 2018-02-13 RX ADMIN — EPHEDRINE SULFATE 15 MG: 50 INJECTION INTRAMUSCULAR; INTRAVENOUS; SUBCUTANEOUS at 16:36

## 2018-02-13 RX ADMIN — DEXAMETHASONE SODIUM PHOSPHATE 8 MG: 4 INJECTION, SOLUTION INTRAMUSCULAR; INTRAVENOUS at 17:30

## 2018-02-13 RX ADMIN — METOPROLOL TARTRATE 5 MG: 5 INJECTION, SOLUTION INTRAVENOUS at 04:44

## 2018-02-13 RX ADMIN — EPHEDRINE SULFATE 10 MG: 50 INJECTION INTRAMUSCULAR; INTRAVENOUS; SUBCUTANEOUS at 16:50

## 2018-02-13 RX ADMIN — SODIUM CHLORIDE: 9 INJECTION, SOLUTION INTRAVENOUS at 15:56

## 2018-02-13 RX ADMIN — LIDOCAINE HYDROCHLORIDE 5 ML: 20 INJECTION, SOLUTION INFILTRATION; PERINEURAL at 16:01

## 2018-02-13 RX ADMIN — ROCURONIUM BROMIDE 10 MG: 10 INJECTION INTRAVENOUS at 16:50

## 2018-02-13 RX ADMIN — PIPERACILLIN SODIUM,TAZOBACTAM SODIUM 3.38 G: 3; .375 INJECTION, POWDER, FOR SOLUTION INTRAVENOUS at 10:56

## 2018-02-13 RX ADMIN — Medication 0.4 MG: at 17:39

## 2018-02-13 RX ADMIN — FENTANYL CITRATE 50 MCG: 50 INJECTION, SOLUTION INTRAMUSCULAR; INTRAVENOUS at 16:50

## 2018-02-13 RX ADMIN — PROPOFOL 150 MG: 10 INJECTION, EMULSION INTRAVENOUS at 16:01

## 2018-02-13 RX ADMIN — FENTANYL CITRATE 50 MCG: 50 INJECTION, SOLUTION INTRAMUSCULAR; INTRAVENOUS at 16:01

## 2018-02-13 RX ADMIN — ROCURONIUM BROMIDE 50 MG: 10 INJECTION INTRAVENOUS at 16:01

## 2018-02-13 RX ADMIN — FENTANYL CITRATE 50 MCG: 50 INJECTION, SOLUTION INTRAMUSCULAR; INTRAVENOUS at 16:48

## 2018-02-13 RX ADMIN — Medication 10 ML: at 23:32

## 2018-02-13 RX ADMIN — ATORVASTATIN CALCIUM 40 MG: 40 TABLET, FILM COATED ORAL at 23:31

## 2018-02-13 RX ADMIN — SODIUM CHLORIDE: 9 INJECTION, SOLUTION INTRAVENOUS at 17:20

## 2018-02-13 RX ADMIN — ONDANSETRON 4 MG: 2 INJECTION INTRAMUSCULAR; INTRAVENOUS at 17:30

## 2018-02-13 RX ADMIN — PIPERACILLIN SODIUM,TAZOBACTAM SODIUM 3.38 G: 3; .375 INJECTION, POWDER, FOR SOLUTION INTRAVENOUS at 18:59

## 2018-02-13 RX ADMIN — FENTANYL CITRATE 50 MCG: 50 INJECTION, SOLUTION INTRAMUSCULAR; INTRAVENOUS at 17:40

## 2018-02-13 ASSESSMENT — PULMONARY FUNCTION TESTS
PIF_VALUE: 1
PIF_VALUE: 30
PIF_VALUE: 25
PIF_VALUE: 24
PIF_VALUE: 25
PIF_VALUE: 22
PIF_VALUE: 24
PIF_VALUE: 22
PIF_VALUE: 23
PIF_VALUE: 22
PIF_VALUE: 21
PIF_VALUE: 27
PIF_VALUE: 12
PIF_VALUE: 28
PIF_VALUE: 20
PIF_VALUE: 26
PIF_VALUE: 6
PIF_VALUE: 30
PIF_VALUE: 24
PIF_VALUE: 23
PIF_VALUE: 1
PIF_VALUE: 25
PIF_VALUE: 24
PIF_VALUE: 23
PIF_VALUE: 1
PIF_VALUE: 24
PIF_VALUE: 24
PIF_VALUE: 26
PIF_VALUE: 2
PIF_VALUE: 11
PIF_VALUE: 24
PIF_VALUE: 23
PIF_VALUE: 26
PIF_VALUE: 22
PIF_VALUE: 23
PIF_VALUE: 24
PIF_VALUE: 26
PIF_VALUE: 26
PIF_VALUE: 28
PIF_VALUE: 24
PIF_VALUE: 29
PIF_VALUE: 22
PIF_VALUE: 18
PIF_VALUE: 25
PIF_VALUE: 24
PIF_VALUE: 23
PIF_VALUE: 22
PIF_VALUE: 5
PIF_VALUE: 24
PIF_VALUE: 27
PIF_VALUE: 22
PIF_VALUE: 25
PIF_VALUE: 24
PIF_VALUE: 24
PIF_VALUE: 27
PIF_VALUE: 10
PIF_VALUE: 26
PIF_VALUE: 27
PIF_VALUE: 26
PIF_VALUE: 31
PIF_VALUE: 2
PIF_VALUE: 21
PIF_VALUE: 24
PIF_VALUE: 25
PIF_VALUE: 5
PIF_VALUE: 24
PIF_VALUE: 30
PIF_VALUE: 25
PIF_VALUE: 26
PIF_VALUE: 20
PIF_VALUE: 22
PIF_VALUE: 27
PIF_VALUE: 25
PIF_VALUE: 27
PIF_VALUE: 24
PIF_VALUE: 5
PIF_VALUE: 26
PIF_VALUE: 25
PIF_VALUE: 23
PIF_VALUE: 8
PIF_VALUE: 5
PIF_VALUE: 23
PIF_VALUE: 27
PIF_VALUE: 14
PIF_VALUE: 20
PIF_VALUE: 4
PIF_VALUE: 24
PIF_VALUE: 26
PIF_VALUE: 27
PIF_VALUE: 26
PIF_VALUE: 26
PIF_VALUE: 29
PIF_VALUE: 22
PIF_VALUE: 25
PIF_VALUE: 24
PIF_VALUE: 26
PIF_VALUE: 26
PIF_VALUE: 1
PIF_VALUE: 22
PIF_VALUE: 25
PIF_VALUE: 26
PIF_VALUE: 30
PIF_VALUE: 26
PIF_VALUE: 8
PIF_VALUE: 26
PIF_VALUE: 25
PIF_VALUE: 26
PIF_VALUE: 26
PIF_VALUE: 16
PIF_VALUE: 1
PIF_VALUE: 25

## 2018-02-13 ASSESSMENT — PAIN DESCRIPTION - DESCRIPTORS
DESCRIPTORS: SHARP
DESCRIPTORS: SORE

## 2018-02-13 ASSESSMENT — PAIN SCALES - GENERAL
PAINLEVEL_OUTOF10: 3
PAINLEVEL_OUTOF10: 7
PAINLEVEL_OUTOF10: 0
PAINLEVEL_OUTOF10: 3
PAINLEVEL_OUTOF10: 4
PAINLEVEL_OUTOF10: 2
PAINLEVEL_OUTOF10: 4

## 2018-02-13 ASSESSMENT — PAIN DESCRIPTION - PAIN TYPE
TYPE: SURGICAL PAIN
TYPE: ACUTE PAIN
TYPE: SURGICAL PAIN

## 2018-02-13 ASSESSMENT — PAIN DESCRIPTION - LOCATION
LOCATION: ABDOMEN

## 2018-02-13 ASSESSMENT — PAIN DESCRIPTION - FREQUENCY
FREQUENCY: CONTINUOUS
FREQUENCY: CONTINUOUS

## 2018-02-13 ASSESSMENT — PAIN DESCRIPTION - ORIENTATION: ORIENTATION: MID;UPPER

## 2018-02-13 ASSESSMENT — ENCOUNTER SYMPTOMS: ABDOMINAL PAIN: 1

## 2018-02-13 NOTE — PLAN OF CARE
Problem: Pain:  Goal: Patient's pain/discomfort is manageable  Patient's pain/discomfort is manageable   Outcome: Ongoing  Adequate pain control achieved this shift. See MAR. Problem: Falls - Risk of  Goal: Absence of falls  Outcome: Ongoing  Pt. Free of falls and injuries this shift.

## 2018-02-13 NOTE — PROGRESS NOTES
[address] [facility]    Progress Note    2/13/2018    8:09 AM    Name:   Ruddy Duffy  MRN:     [id number]     Acct:      [de-identified]   Room:   [address] [place] [address] Day:  1  Admit Date:  2/12/2018  7:38 AM    PCP:   Ish Lennon CNP  Code Status:  Full Code    Subjective:     C/C:   Chief Complaint   Patient presents with    Chest Pain     Interval History Status: improved. Patient seen and examined at bedside. Patient continues to have some mild abdominal pain. Received morphine overnight. Had some chills overnight. Denies other complaints. Per RN cardiology cleared. Plan for surgery in afternoon. Brief History:     The patient is a 45 y.o. Non-/non  male who presents with Chest Pain   and he is admitted to the hospital for the management of cholelithiasis. Patient reports onset of chest pain early morning located at the inferior sternum described as severe, sharp, without radiation, with associated diaphoresis. Patient's children called EMS. Patient states this pain has happened once prior in 2015. No change is quality with activity. In ED, CT revealed gallstone with gallbladder dilation. Patient having epigastric pain on palpation. General surgery consulted from ED. HIDA to be done. Review of Systems:     Review of Systems   Constitutional: Positive for chills. Negative for fever. Cardiovascular: Positive for leg swelling (minimal). Negative for chest pain. Gastrointestinal: Positive for abdominal pain. Neurological: Negative for headaches. Medications: Allergies: Allergies   Allergen Reactions    Compazine [Prochlorperazine Maleate] Other (See Comments)     Unknown reaction. Positive reaction during allergy scratch testing.         Current Meds:   Scheduled Meds:    sodium chloride flush  10 mL Intravenous 2 times per day    metoprolol  5 mg Intravenous Q6H    piperacillin-tazobactam (ZOSYN) of right upper quadrant ascites. 1. Diffuse fatty infiltration of the liver with probable focal fatty sparing adjacent to the gallbladder in the gallbladder fossa. 2. Otherwise, unremarkable right upper quadrant abdominal ultrasound. Cta Chest W Contrast    Result Date: 2/12/2018  EXAMINATION: CTA OF THE CHEST; CTA OF THE ABDOMEN AND PELVIS WITH AND WITHOUT CONTRAST 2/12/2018 9:13 am TECHNIQUE: CTA of the chest was performed after the administration of intravenous contrast.  Multiplanar reformatted images are provided for review. MIP images are provided for review. Dose modulation, iterative reconstruction, and/or weight based adjustment of the mA/kV was utilized to reduce the radiation dose to as low as reasonably achievable.; CTA of the abdomen and pelvis was performed without and with the administration of intravenous contrast. Multiplanar reformatted images are provided for review. MIP images are provided for review. Dose modulation, iterative reconstruction, and/or weight based adjustment of the mA/kV was utilized to reduce the radiation dose to as low as reasonably achievable. 3D reconstructed images were performed on a separate workstation and provided for review. COMPARISON: None. HISTORY: ORDERING SYSTEM PROVIDED HISTORY: aortic dissection, epigastric and CP TECHNOLOGIST PROVIDED HISTORY: Ordering Physician Provided Reason for Exam: PT STATES HE HAS MID UPPER ABDOMINAL PAIN SINCE 4 AM THIS MORNING Acuity: Acute Type of Exam: Initial FINDINGS: Aorta: No evidence of aortic aneurysm or dissection. No acute abnormality of the aorta. Origins of the great vessels appear widely patent. Abdominal visceral branches appear widely patent. Mediastinum: No evidence of mediastinal lymphadenopathy. Normal heart size. Pulmonary trunk appears unremarkable. Left-sided pacemaker is in place. Postsurgical changes of open heart surgery are noted.  Lungs/Pleura: Subsegmental atelectasis is seen in the right lower continue to monitor BP    Tiana Byrnes MD  2/13/2018  8:09 AM       IM Attending    Pt seen and examined today   I have discussed the care of pt , including pertinent history and exam findings,  with the resident. I have reviewed the key elements of all parts of the encounter with the resident. I agree with the assessment, plan and orders as documented by the resident.     Electronically signed by Ruma Vega MD on 2/13/2018 at 11:11 AM

## 2018-02-13 NOTE — CONSULTS
Trachea is midline. Subtle left basilar atelectasis/scarring. Osseous structures and soft tissues are grossly intact. There is no evidence of subdiaphragmatic free intraperitoneal air. Status post midline sternotomy. Upright and supine views of the abdomen demonstrate a nonspecific, nonobstructive bowel gas pattern. Air and fecal material noted overlying the colon. Relative paucity of small bowel gas There is no evidence for free intraperitoneal air. Osseous structures and soft tissues are grossly intact. Multiple small calcifications overlying the pelvis are most compatible with phleboliths. No evidence for acute cardiopulmonary pathology. Nonspecific, nonobstructive bowel gas pattern. No evidence for abnormal air-fluid levels or free intraperitoneal air. Nm Hepatobiliary    Result Date: 2/12/2018  EXAMINATION: NUCLEAR MEDICINE HEPATOBILIARY SCINTIGRAPHY (HIDA SCAN). 2/12/2018 3:24 pm TECHNIQUE: Approximately 4.8 [id number] Mebrofenin (Choletec) was administered IV. Then, dynamic images of the abdomen were obtained in the anterior projection for 60 mins. A right lateral view was also obtained at 60 mins. 2.1 millicurie of technetium 80 M mebrofenin booster dose was given. 4 mg of morphine was administered. COMPARISON: CTA abdomen/ pelvis performed on 02/12/2018 HISTORY: ORDERING SYSTEM PROVIDED HISTORY: cholecystitis TECHNOLOGIST PROVIDED HISTORY: Ordering Physician Provided Reason for Exam: cholecystitis Acuity: Unknown Type of Exam: Unknown Additional signs and symptoms: CT 2/12/18: large stone in neck of GB, GBUS 2/12/18: unremarkable 78-year-old male with suspected acute cholecystitis FINDINGS: Prompt, homogenous uptake by the liver is noted with normal appearance of radiotracer excretion into the biliary system. Clearance of bloodpool activity appears appropriate. Radiotracer activity is seen within the proximal small bowel loops.  No radiotracer uptake is seen within the gallbladder prior to or following the administration of morphine. Nonvisualization of the gallbladder prior to and following the administration of morphine which is concerning for acute cholecystitis/cystic duct obstruction. The findings were sent to the Radiology Results [address] at 3:29 pm on 2/12/2018to be communicated to a licensed caregiver. Us Gallbladder Ruq    Result Date: 2/12/2018  EXAMINATION: RIGHT UPPER QUADRANT ULTRASOUND 2/12/2018 8:03 am COMPARISON: None. HISTORY: ORDERING SYSTEM PROVIDED HISTORY: cholecystitis TECHNOLOGIST PROVIDED HISTORY: Ordering Physician Provided Reason for Exam: epigastric pain Acuity: Acute Type of Exam: Initial 66-year-old male with epigastric abdominal pain, nausea, and vomiting for 4 hours FINDINGS: LIVER:  Diffuse heterogeneous increased echogenicity throughout the hepatic parenchyma which most likely represents diffuse fatty infiltration of the liver. There is probable focal fatty sparing adjacent to the gallbladder in the gallbladder fossa on image 12. BILIARY SYSTEM:  Gallbladder is unremarkable without evidence of pericholecystic fluid, wall thickening or stones. Negative sonographic Gallardo's sign. Gallbladder wall thickness is normal measuring 2 mm. Common bile duct is within normal limits measuring up to 5 mm. RIGHT KIDNEY: Partial visualization of the right kidney demonstrates no gross right-sided hydronephrosis. PANCREAS:  Pancreas not well visualized. OTHER: No evidence of right upper quadrant ascites. 1. Diffuse fatty infiltration of the liver with probable focal fatty sparing adjacent to the gallbladder in the gallbladder fossa. 2. Otherwise, unremarkable right upper quadrant abdominal ultrasound.      Cta Chest W Contrast    Result Date: 2/12/2018  EXAMINATION: CTA OF THE CHEST; CTA OF THE ABDOMEN AND PELVIS WITH AND WITHOUT CONTRAST 2/12/2018 9:13 am TECHNIQUE: CTA of the chest was performed after the administration of intravenous contrast. evidence of aortic aneurysm or dissection. No acute abnormality of the aorta. Origins of the great vessels appear widely patent. Abdominal visceral branches appear widely patent. Mediastinum: No evidence of mediastinal lymphadenopathy. Normal heart size. Pulmonary trunk appears unremarkable. Left-sided pacemaker is in place. Postsurgical changes of open heart surgery are noted. Lungs/Pleura: Subsegmental atelectasis is seen in the right lower lobe and inferior lingula. No focal consolidation or pulmonary edema. No evidence of pleural effusion or pneumothorax. Organs: The liver, spleen, pancreas, right adrenal gland and left kidney appear unremarkable. Gallbladder is dilated with 1.7 cm peripherally calcified gallstone at the neck. Focal scarring inferior pole right kidney. 8 mm well-circumscribed hypodense lesion anterior lower pole right kidney which likely represent simple cysts. Mild thickening of the left adrenal gland. GI/Bowel: No evidence of bowel obstruction or inflammation. Pelvis: Bladder and prostate appear unremarkable. Scattered pelvic phleboliths. Peritoneum/Retroperitoneum: Normal caliber abdominal aorta. No suspicious lymphadenopathy. No ascites or free air. Bones/Soft Tissues: Severe left-sided L5-S1 disc space and neural foraminal narrowing as well as bilateral osteophytosis. Small fat containing umbilical hernia. 1. Unremarkable appearance of the aorta. 2. Dilated gallbladder with large stone at the neck. 3. Few incidental/chronic findings as detailed above. RECOMMENDATIONS: Consider further evaluation HIDA scan to assess gallbladder as clinically warranted. IMPRESSION:   1. Symptomatic cholelithiasis  2. HIDA Scan showing evidence of acute cholecystitis  3. History of coronary artery disease history of valve replacement  4. Morbid obesity     does not have any pertinent problems on file. PLAN:   1. Cardiac clearance  2. IV Zosyn  3.  Nothing by mouth after

## 2018-02-13 NOTE — ANESTHESIA PRE PROCEDURE
chloride flush 0.9 % injection 10 mL  10 mL Intravenous PRN Dileep León MD   10 mL at 02/12/18 1701    [MAR Hold] piperacillin-tazobactam (ZOSYN) 3.375 g in dextrose 5 % 50 mL IVPB extended infusion (mini-bag)  3.375 g Intravenous Leonie Smalls MD 12.5 mL/hr at 02/13/18 1056 3.375 g at 02/13/18 1056       Allergies: Allergies   Allergen Reactions    Compazine [Prochlorperazine Maleate] Other (See Comments)     Unknown reaction. Positive reaction during allergy scratch testing. Problem List:    Patient Active Problem List   Diagnosis Code    Chest pain R07.9    Hypertension I10    Pacemaker Z95.0    Abdominal pain R10.9    Symptomatic cholelithiasis K80.20       Past Medical History:        Diagnosis Date    Hypertension     Sepsis ([facility] [address]     2014       Past Surgical History:        Procedure Laterality Date    CARDIAC PACEMAKER PLACEMENT      CARDIAC VALVE REPLACEMENT      pig valve       Social History:    Social History   Substance Use Topics    Smoking status: Former Smoker     Quit date: 2/12/2014    Smokeless tobacco: Never Used    Alcohol use No                                Counseling given: Not Answered      Vital Signs (Current):   Vitals:    02/13/18 0440 02/13/18 0652 02/13/18 1052 02/13/18 1218   BP: 117/76 125/79 (!) 132/91 128/62   Pulse: 77 78 65 70   Resp: 16 16  16   Temp:  97.6 °F (36.4 °C)  97.8 °F (36.6 °C)   TempSrc:  Oral  Oral   SpO2: 94% 92%  92%   Weight:       Height:                                                  BP Readings from Last 3 Encounters:   02/13/18 128/62       NPO Status:                                                                                 BMI:   Wt Readings from Last 3 Encounters:   02/12/18 250 lb (113.4 kg)     Body mass index is 33.91 kg/m².     CBC:   Lab Results   Component Value Date    WBC 7.2 02/13/2018    RBC 4.51 02/13/2018    HGB 14.4 02/13/2018    HCT 41.9 02/13/2018    MCV 92.8 02/13/2018    RDW 12.9 02/13/2018

## 2018-02-13 NOTE — PROGRESS NOTES
Patient c/o sharp pain of 3/10 in the mid epigastric area. No pain meds ordered for this patient. Called and spoke with dr Brandon Trejo, who gave a one time order for morphine.

## 2018-02-14 VITALS
BODY MASS INDEX: 33.86 KG/M2 | TEMPERATURE: 99.1 F | HEART RATE: 86 BPM | WEIGHT: 250 LBS | DIASTOLIC BLOOD PRESSURE: 73 MMHG | RESPIRATION RATE: 18 BRPM | SYSTOLIC BLOOD PRESSURE: 118 MMHG | HEIGHT: 72 IN | OXYGEN SATURATION: 93 %

## 2018-02-14 LAB
ABSOLUTE EOS #: 0 K/UL (ref 0–0.4)
ABSOLUTE IMMATURE GRANULOCYTE: ABNORMAL K/UL (ref 0–0.3)
ABSOLUTE LYMPH #: 0.8 K/UL (ref 1–4.8)
ABSOLUTE MONO #: 0.4 K/UL (ref 0.1–1.3)
ANION GAP SERPL CALCULATED.3IONS-SCNC: 9 MMOL/L (ref 9–17)
BASOPHILS # BLD: 0 % (ref 0–2)
BASOPHILS ABSOLUTE: 0 K/UL (ref 0–0.2)
BUN BLDV-MCNC: 16 MG/DL (ref 6–20)
BUN/CREAT BLD: ABNORMAL (ref 9–20)
CALCIUM SERPL-MCNC: 8.8 MG/DL (ref 8.6–10.4)
CHLORIDE BLD-SCNC: 100 MMOL/L (ref 98–107)
CO2: 29 MMOL/L (ref 20–31)
CREAT SERPL-MCNC: 1.2 MG/DL (ref 0.7–1.2)
DIFFERENTIAL TYPE: ABNORMAL
EOSINOPHILS RELATIVE PERCENT: 0 % (ref 0–4)
GFR AFRICAN AMERICAN: >60 ML/MIN
GFR NON-AFRICAN AMERICAN: >60 ML/MIN
GFR SERPL CREATININE-BSD FRML MDRD: ABNORMAL ML/MIN/{1.73_M2}
GFR SERPL CREATININE-BSD FRML MDRD: ABNORMAL ML/MIN/{1.73_M2}
GLUCOSE BLD-MCNC: 126 MG/DL (ref 70–99)
HCT VFR BLD CALC: 41.2 % (ref 41–53)
HEMOGLOBIN: 14.2 G/DL (ref 13.5–17.5)
IMMATURE GRANULOCYTES: ABNORMAL %
LYMPHOCYTES # BLD: 10 % (ref 24–44)
MCH RBC QN AUTO: 32.3 PG (ref 26–34)
MCHC RBC AUTO-ENTMCNC: 34.4 G/DL (ref 31–37)
MCV RBC AUTO: 93.8 FL (ref 80–100)
MONOCYTES # BLD: 5 % (ref 1–7)
NRBC AUTOMATED: ABNORMAL PER 100 WBC
PDW BLD-RTO: 13.1 % (ref 11.5–14.9)
PLATELET # BLD: 219 K/UL (ref 150–450)
PLATELET ESTIMATE: ABNORMAL
PMV BLD AUTO: 8.1 FL (ref 6–12)
POTASSIUM SERPL-SCNC: 4.1 MMOL/L (ref 3.7–5.3)
RBC # BLD: 4.4 M/UL (ref 4.5–5.9)
RBC # BLD: ABNORMAL 10*6/UL
SEG NEUTROPHILS: 85 % (ref 36–66)
SEGMENTED NEUTROPHILS ABSOLUTE COUNT: 7.5 K/UL (ref 1.3–9.1)
SODIUM BLD-SCNC: 138 MMOL/L (ref 135–144)
WBC # BLD: 8.8 K/UL (ref 3.5–11)
WBC # BLD: ABNORMAL 10*3/UL

## 2018-02-14 PROCEDURE — 36415 COLL VENOUS BLD VENIPUNCTURE: CPT

## 2018-02-14 PROCEDURE — 85025 COMPLETE CBC W/AUTO DIFF WBC: CPT

## 2018-02-14 PROCEDURE — 2580000003 HC RX 258: Performed by: SURGERY

## 2018-02-14 PROCEDURE — 80048 BASIC METABOLIC PNL TOTAL CA: CPT

## 2018-02-14 PROCEDURE — 99239 HOSP IP/OBS DSCHRG MGMT >30: CPT | Performed by: INTERNAL MEDICINE

## 2018-02-14 PROCEDURE — 6360000002 HC RX W HCPCS: Performed by: SURGERY

## 2018-02-14 RX ORDER — ATORVASTATIN CALCIUM 40 MG/1
40 TABLET, FILM COATED ORAL NIGHTLY
Qty: 30 TABLET | Refills: 3 | Status: SHIPPED | OUTPATIENT
Start: 2018-02-14

## 2018-02-14 RX ORDER — LEVOFLOXACIN 500 MG/1
500 TABLET, FILM COATED ORAL DAILY
Status: CANCELLED | OUTPATIENT
Start: 2018-02-14 | End: 2018-02-24

## 2018-02-14 RX ORDER — OXYCODONE HYDROCHLORIDE AND ACETAMINOPHEN 5; 325 MG/1; MG/1
1 TABLET ORAL EVERY 6 HOURS PRN
Qty: 20 TABLET | Refills: 0 | Status: SHIPPED | OUTPATIENT
Start: 2018-02-14 | End: 2018-02-19

## 2018-02-14 RX ORDER — LEVOFLOXACIN 500 MG/1
500 TABLET, FILM COATED ORAL DAILY
Qty: 10 TABLET | Refills: 0 | Status: SHIPPED | OUTPATIENT
Start: 2018-02-14 | End: 2018-02-24

## 2018-02-14 RX ADMIN — PIPERACILLIN SODIUM,TAZOBACTAM SODIUM 3.38 G: 3; .375 INJECTION, POWDER, FOR SOLUTION INTRAVENOUS at 12:05

## 2018-02-14 RX ADMIN — PIPERACILLIN SODIUM,TAZOBACTAM SODIUM 3.38 G: 3; .375 INJECTION, POWDER, FOR SOLUTION INTRAVENOUS at 04:17

## 2018-02-14 ASSESSMENT — ENCOUNTER SYMPTOMS
ABDOMINAL PAIN: 1
SHORTNESS OF BREATH: 0

## 2018-02-14 ASSESSMENT — PAIN SCALES - GENERAL: PAINLEVEL_OUTOF10: 2

## 2018-02-14 NOTE — PLAN OF CARE
Problem: Falls - Risk of  Goal: Absence of falls  Outcome: Ongoing  No falls this shift. Patient is alert and oriented. Call light is within reach.

## 2018-02-14 NOTE — PLAN OF CARE
Problem: Pain:  Goal: Patient's pain/discomfort is manageable  Patient's pain/discomfort is manageable   Outcome: Ongoing  Pt medicated with pain medication prn. Assessed all pain characteristics including level, type, location, frequency, and onset. Non-pharmacologic interventions offered to pt as well. Pt states pain is tolerable at this time. Will continue to monitor. Problem: Falls - Risk of  Goal: Absence of falls  Outcome: Ongoing  No falls noted this shift. Patient ambulates per self without difficulty. Bed kept in low position. Safe environment maintained. Bedside table & call light in reach. Uses call light appropriately when needing assistance.

## 2018-02-14 NOTE — PROGRESS NOTES
10 mL Intravenous 2 times per day    metoprolol  5 mg Intravenous Q6H    piperacillin-tazobactam (ZOSYN) 3.375 g in dextrose 5% IVPB extended infusion (mini-bag)  3.375 g Intravenous Q8H     Continuous Infusions:    PRN Meds: oxyCODONE-acetaminophen **OR** oxyCODONE-acetaminophen, fentanNYL, ondansetron, acetaminophen, magnesium hydroxide, sodium chloride flush, sodium chloride flush    Data:     Past Medical History:   has a past medical history of Hypertension and Sepsis ([address] Social History:   reports that he quit smoking about 4 years ago. He has never used smokeless tobacco. He reports that he does not drink alcohol or use drugs. Family History: History reviewed. No pertinent family history. Vitals:  /66   Pulse 76   Temp 98 °F (36.7 °C) (Oral)   Resp 18   Ht 6' (1.829 m)   Wt 250 lb (113.4 kg)   SpO2 93%   BMI 33.91 kg/m²   Temp (24hrs), Av.7 °F (36.5 °C), Min:97 °F (36.1 °C), Max:98.4 °F (36.9 °C)    No results for input(s): POCGLU in the last 72 hours. I/O (24Hr):     Intake/Output Summary (Last 24 hours) at 18 0733  Last data filed at 18 0600   Gross per 24 hour   Intake             1400 ml   Output              780 ml   Net              620 ml       Labs:    CBC:   Lab Results   Component Value Date    WBC 8.8 2018    RBC 4.40 2018    HGB 14.2 2018    HCT 41.2 2018    MCV 93.8 2018    MCH 32.3 2018    MCHC 34.4 2018    RDW 13.1 2018     2018    MPV 8.1 2018     BMP:    Lab Results   Component Value Date     2018    K 4.1 2018     2018    CO2 29 2018    BUN 16 2018    LABALBU 3.8 2018    CREATININE 1.20 2018    CALCIUM 8.8 2018    GFRAA >60 2018    LABGLOM >60 2018    GLUCOSE 126 2018          No results found for: SPECIAL  No results found for: CULTURE    Radiology:    Physical Examination:        Physical Exam compliance  - start BB  - continue to monitor BP    Dispo: Discharge planning    Vincent Parks MD  2/14/2018  7:33 AM     IM Attending     Pt seen and examined before discharge   Discharge plan and medications were reviewed and agreed as documented by resident.   Time spent for discharge planning more than 30 minutes     Electronically signed by Flaca Emanuel MD on 2/14/2018 at 10:02 AM

## 2018-02-14 NOTE — PLAN OF CARE
Problem: Pain:  Goal: Patient's pain/discomfort is manageable  Patient's pain/discomfort is manageable   Outcome: Ongoing  Patient medicated for pain x1 with effectiveness, patient remains comfortable    Problem: Falls - Risk of  Goal: Absence of falls  Outcome: Ongoing  Patient makes no attempts to get out of bed per self, alert and oriented and calls for assistance

## 2018-02-14 NOTE — PROGRESS NOTES
[facility])        Progress Note    2018 3:46 PM      Subjective:  Mr. Ramos Duarte  has no chest pain, shortness of breath, or palpitations      Review of system: Constitutional:    No fever                                   GI:                      Mild abdominal pain post surgery                                   Extremities:        No leg swelling          LABS:     CBC: Recent Labs      18   0759  18   0445  18   0521   WBC  7.3  7.2  8.8   HGB  15.7  14.4  14.2   HCT  46.9  41.9  41.2   MCV  92.9  92.8  93.8   PLT  245  212  219     BMP: Recent Labs      18   0818  18   0445  18   0521   NA  140  141  138   K  4.2  4.0  4.1   CL  102  101  100   CO2  26  32*  29   BUN  12  13  16   CREATININE  1.01  1.22*  1.20     PT/INR:   Recent Labs      18   PROTIME  10.5   INR  1.0     APTT: No results for input(s): APTT in the last 72 hours. MAG:   Recent Labs      18   0818  18   0445   MG  2.0  1.9     D Dimer: No results for input(s): DDIMER in the last 72 hours. Troponin: Recent Labs      18   0818  18   1020  18   1803   TROPONINT  <0.03  <0.03  <0.03     PROBNP No results for input(s): PROBNP in the last 72 hours. Pulse Ox: SpO2  Av.5 %  Min: 85 %  Max: 100 %  Supplemental O2: O2 Flow Rate (L/min): 3 L/min     Current Meds:    atorvastatin  40 mg Oral Nightly    sodium chloride flush  10 mL Intravenous 2 times per day    metoprolol  5 mg Intravenous Q6H    piperacillin-tazobactam (ZOSYN) 3.375 g in dextrose 5% IVPB extended infusion (mini-bag)  3.375 g Intravenous Q8H     Continuous Infusions:         VITAL SIGNS:    /73   Pulse 86   Temp 99.1 °F (37.3 °C) (Oral)   Resp 18   Ht 6' (1.829 m)   Wt 250 lb (113.4 kg)   SpO2 93%   BMI 33.91 kg/m²  3 L/min      Admit Weight:  250 lb (113.4 kg)    Last 3 weights:   Wt Readings from Last 3 Encounters:   18 250 lb (113.4 kg)       BMI: Body mass index is 33.91 kg/m². INPUT/OUTPUT:        Intake/Output Summary (Last 24 hours) at 02/14/18 1546  Last data filed at 02/14/18 0600   Gross per 24 hour   Intake             1400 ml   Output              780 ml   Net              620 ml            EXAM:     General appearance: awake and alert moves all ext   Lungs: no rhonchi, no wheezes, no rales  Heart: S1 and S2 no murmur  Abdomen: Tender on palpation  Extremities: warm and dry, no cyanosis, no clubbing      Principal Problem:    Chest pain  Active Problems:    Hypertension    Pacemaker    Abdominal pain    Symptomatic cholelithiasis  Resolved Problems:    * No resolved hospital problems. *      Assessment:  #1 Acute cholecystitis status post cholecystectomy  #2 status post mitral valve replacement with porcine mitral valve in 2014. #3 status post permanent pacemaker. #4 history of hypertension. Stopped taking all his medications. #5 noncompliance having had his pacemaker checked since 2015 haven't followed with cardiology since 2015 stopped taking all his medication. #6 obesity  Recommendations:  Change IV Lopressor to by mouth  Advisable to importance of compliance following with our office to have his pacemaker checked in follow-up on the prosthetic mitral valve needed to take his medication regularly.   Advised to come his office in 2 weeks for follow-up      Electronically signed by Cal Gleason MD on 2/14/2018 at 3:46 PM

## 2018-02-14 NOTE — DISCHARGE SUMMARY
[address]    Discharge Summary     Patient ID: Aurea Chen  :  1965   MRN: [id number]     ACCOUNT:  [de-identified]   Patient's PCP: Taras Montgomery CNP  Admit Date: 2018   Discharge Date: 2018  Length of Stay: 2  Code Status:  Full Code  Admitting Physician: Karine Cruz MD  Discharge Physician: Ga Mayer MD     Active Discharge Diagnoses:       Primary Problem  Chest pain      [place] Problems    Diagnosis Date Noted    Chest pain [R07.9] 2018    Hypertension [I10] 2018    Pacemaker [Z95.0] 2018    Abdominal pain [R10.9] 2018    Symptomatic cholelithiasis [K80.20] 2018       Admission Condition:  fair     Discharged Condition: stable    Hospital Stay:       Hospital Course: The patient is a 45 y.o. Non-/non  male who presents with Chest Pain and he is admitted to the hospital for the management of cholelithiasis. Patient reports onset of chest pain early morning located at the inferior sternum described as severe, sharp, without radiation, with associated diaphoresis. Patient's children called EMS. Patient states this pain has happened once prior in . No change is quality with activity. In ED, CT revealed gallstone with gallbladder dilation. Patient having epigastric pain on palpation. General surgery consulted from ED. HIDA concerning for acute cholecystitis/cystic duct obstruction. Given cardiac clearance and scheduled for gallbladder removal.  Patient had robotic cholecystectomy. Postoperative day 1 patient reports some soreness around surgical incisions. Patient afebrile and tolerating by mouth. On day of discharge patient near baseline with manageable pain. Recommend follow up with general surgery, cardiology, PCP.     Significant therapeutic interventions: CT abdomen/pelvis, HIDA, robotic cholecystectomy    Significant Diagnostic Studies:   Labs / Micro:  CBC:   Lab Results   Component Value Date    WBC 8.8 02/14/2018    RBC 4.40 02/14/2018    HGB 14.2 02/14/2018    HCT 41.2 02/14/2018    MCV 93.8 02/14/2018    MCH 32.3 02/14/2018    MCHC 34.4 02/14/2018    RDW 13.1 02/14/2018     02/14/2018     BMP:    Lab Results   Component Value Date    GLUCOSE 126 02/14/2018     02/14/2018    K 4.1 02/14/2018     02/14/2018    CO2 29 02/14/2018    ANIONGAP 9 02/14/2018    BUN 16 02/14/2018    CREATININE 1.20 02/14/2018    BUNCRER NOT REPORTED 02/14/2018    CALCIUM 8.8 02/14/2018    LABGLOM >60 02/14/2018    GFRAA >60 02/14/2018    GFR      02/14/2018    GFR NOT REPORTED 02/14/2018       Radiology:  Xr Acute Abd Series Chest 1 Vw    Result Date: 2/12/2018  EXAMINATION: ACUTE ABDOMINAL SERIES WITH SINGLE  VIEW OF THE CHEST 2/12/2018 8:15 am COMPARISON: None. HISTORY: ORDERING SYSTEM PROVIDED HISTORY: nausea, vomiting TECHNOLOGIST PROVIDED HISTORY: Reason for exam:->nausea, vomiting Ordering Physician Provided Reason for Exam: Upper abdominal pain today Acuity: Acute Type of Exam: Initial Additional signs and symptoms: Upper abdominal pain today FINDINGS: Single frontal view of the chest is submitted for review. The cardiac silhouette is normal in size. Lung parenchyma is clear without focal airspace consolidation, sizable pleural effusion, or pneumothorax. Trachea is midline. Subtle left basilar atelectasis/scarring. Osseous structures and soft tissues are grossly intact. There is no evidence of subdiaphragmatic free intraperitoneal air. Status post midline sternotomy. Upright and supine views of the abdomen demonstrate a nonspecific, nonobstructive bowel gas pattern. Air and fecal material noted overlying the colon. Relative paucity of small bowel gas There is no evidence for free intraperitoneal air. Osseous structures and soft tissues are grossly intact.  Multiple small calcifications overlying the pelvis are most dose to as low as reasonably achievable. 3D reconstructed images were performed on a separate workstation and provided for review. COMPARISON: None. HISTORY: ORDERING SYSTEM PROVIDED HISTORY: aortic dissection, epigastric and CP TECHNOLOGIST PROVIDED HISTORY: Ordering Physician Provided Reason for Exam: PT STATES HE HAS MID UPPER ABDOMINAL PAIN SINCE 4 AM THIS MORNING Acuity: Acute Type of Exam: Initial FINDINGS: Aorta: No evidence of aortic aneurysm or dissection. No acute abnormality of the aorta. Origins of the great vessels appear widely patent. Abdominal visceral branches appear widely patent. Mediastinum: No evidence of mediastinal lymphadenopathy. Normal heart size. Pulmonary trunk appears unremarkable. Left-sided pacemaker is in place. Postsurgical changes of open heart surgery are noted. Lungs/Pleura: Subsegmental atelectasis is seen in the right lower lobe and inferior lingula. No focal consolidation or pulmonary edema. No evidence of pleural effusion or pneumothorax. Organs: The liver, spleen, pancreas, right adrenal gland and left kidney appear unremarkable. Gallbladder is dilated with 1.7 cm peripherally calcified gallstone at the neck. Focal scarring inferior pole right kidney. 8 mm well-circumscribed hypodense lesion anterior lower pole right kidney which likely represent simple cysts. Mild thickening of the left adrenal gland. GI/Bowel: No evidence of bowel obstruction or inflammation. Pelvis: Bladder and prostate appear unremarkable. Scattered pelvic phleboliths. Peritoneum/Retroperitoneum: Normal caliber abdominal aorta. No suspicious lymphadenopathy. No ascites or free air. Bones/Soft Tissues: Severe left-sided L5-S1 disc space and neural foraminal narrowing as well as bilateral osteophytosis. Small fat containing umbilical hernia. 1. Unremarkable appearance of the aorta. 2. Dilated gallbladder with large stone at the neck. 3. Few incidental/chronic findings as detailed above. RECOMMENDATIONS: Consider further evaluation HIDA scan to assess gallbladder as clinically warranted. Cta Abdomen Pelvis W Contrast    Result Date: 2/12/2018  EXAMINATION: CTA OF THE CHEST; CTA OF THE ABDOMEN AND PELVIS WITH AND WITHOUT CONTRAST 2/12/2018 9:13 am TECHNIQUE: CTA of the chest was performed after the administration of intravenous contrast.  Multiplanar reformatted images are provided for review. MIP images are provided for review. Dose modulation, iterative reconstruction, and/or weight based adjustment of the mA/kV was utilized to reduce the radiation dose to as low as reasonably achievable.; CTA of the abdomen and pelvis was performed without and with the administration of intravenous contrast. Multiplanar reformatted images are provided for review. MIP images are provided for review. Dose modulation, iterative reconstruction, and/or weight based adjustment of the mA/kV was utilized to reduce the radiation dose to as low as reasonably achievable. 3D reconstructed images were performed on a separate workstation and provided for review. COMPARISON: None. HISTORY: ORDERING SYSTEM PROVIDED HISTORY: aortic dissection, epigastric and CP TECHNOLOGIST PROVIDED HISTORY: Ordering Physician Provided Reason for Exam: PT STATES HE HAS MID UPPER ABDOMINAL PAIN SINCE 4 AM THIS MORNING Acuity: Acute Type of Exam: Initial FINDINGS: Aorta: No evidence of aortic aneurysm or dissection. No acute abnormality of the aorta. Origins of the great vessels appear widely patent. Abdominal visceral branches appear widely patent. Mediastinum: No evidence of mediastinal lymphadenopathy. Normal heart size. Pulmonary trunk appears unremarkable. Left-sided pacemaker is in place. Postsurgical changes of open heart surgery are noted. Lungs/Pleura: Subsegmental atelectasis is seen in the right lower lobe and inferior lingula. No focal consolidation or pulmonary edema. No evidence of pleural effusion or pneumothorax.  Organs:

## 2018-02-15 LAB — SURGICAL PATHOLOGY REPORT: NORMAL

## 2018-02-17 NOTE — PROGRESS NOTES
.Date Patient Discharged:02/14/18      Today's Date:02/16/18      Spoke with:Pt      Do you understand the purpose of your medications?:  Yes    Do you understand the side effects of your new medications?:  Yes but not adequately    Would you like to speak with a pharmacist about any of your medications or the side effects?:  No    Were you able to get your prescriptions filled?:  Yes    Did you understand your discharge instructions and what you are responsible for in managing your health after discharge?:  Yes    While you were here, was your call light answered promptly?:  Did not use     Was the area around your room kept quiet at night?:  Yes    Were your room and bathroom kept clean?:Yes

## 2018-05-18 ENCOUNTER — HOSPITAL ENCOUNTER (OUTPATIENT)
Dept: PREADMISSION TESTING | Age: 53
Discharge: HOME | End: 2018-05-22
Payer: MEDICARE

## 2018-05-18 VITALS
BODY MASS INDEX: 39.68 KG/M2 | HEART RATE: 73 BPM | TEMPERATURE: 97.7 F | WEIGHT: 293 LBS | SYSTOLIC BLOOD PRESSURE: 147 MMHG | OXYGEN SATURATION: 96 % | RESPIRATION RATE: 16 BRPM | HEIGHT: 72 IN | DIASTOLIC BLOOD PRESSURE: 92 MMHG

## 2018-05-18 LAB
ABSOLUTE EOS #: 0.4 K/UL (ref 0–0.4)
ABSOLUTE IMMATURE GRANULOCYTE: ABNORMAL K/UL (ref 0–0.3)
ABSOLUTE LYMPH #: 1.6 K/UL (ref 1–4.8)
ABSOLUTE MONO #: 0.4 K/UL (ref 0.1–1.3)
ANION GAP SERPL CALCULATED.3IONS-SCNC: 10 MMOL/L (ref 9–17)
BASOPHILS # BLD: 1 % (ref 0–2)
BASOPHILS ABSOLUTE: 0.1 K/UL (ref 0–0.2)
BUN BLDV-MCNC: 13 MG/DL (ref 6–20)
BUN/CREAT BLD: ABNORMAL (ref 9–20)
CALCIUM SERPL-MCNC: 9.4 MG/DL (ref 8.6–10.4)
CHLORIDE BLD-SCNC: 101 MMOL/L (ref 98–107)
CO2: 29 MMOL/L (ref 20–31)
CREAT SERPL-MCNC: 1.2 MG/DL (ref 0.7–1.2)
DIFFERENTIAL TYPE: ABNORMAL
EOSINOPHILS RELATIVE PERCENT: 6 % (ref 0–4)
GFR AFRICAN AMERICAN: >60 ML/MIN
GFR NON-AFRICAN AMERICAN: >60 ML/MIN
GFR SERPL CREATININE-BSD FRML MDRD: ABNORMAL ML/MIN/{1.73_M2}
GFR SERPL CREATININE-BSD FRML MDRD: ABNORMAL ML/MIN/{1.73_M2}
GLUCOSE BLD-MCNC: 101 MG/DL (ref 70–99)
HCT VFR BLD CALC: 44.7 % (ref 41–53)
HEMOGLOBIN: 15.1 G/DL (ref 13.5–17.5)
IMMATURE GRANULOCYTES: ABNORMAL %
LYMPHOCYTES # BLD: 28 % (ref 24–44)
MCH RBC QN AUTO: 32.1 PG (ref 26–34)
MCHC RBC AUTO-ENTMCNC: 33.7 G/DL (ref 31–37)
MCV RBC AUTO: 95.3 FL (ref 80–100)
MONOCYTES # BLD: 7 % (ref 1–7)
NRBC AUTOMATED: ABNORMAL PER 100 WBC
PDW BLD-RTO: 13.9 % (ref 11.5–14.9)
PLATELET # BLD: 236 K/UL (ref 150–450)
PLATELET ESTIMATE: ABNORMAL
PMV BLD AUTO: 8.3 FL (ref 6–12)
POTASSIUM SERPL-SCNC: 4.8 MMOL/L (ref 3.7–5.3)
RBC # BLD: 4.69 M/UL (ref 4.5–5.9)
RBC # BLD: ABNORMAL 10*6/UL
SEG NEUTROPHILS: 58 % (ref 36–66)
SEGMENTED NEUTROPHILS ABSOLUTE COUNT: 3.3 K/UL (ref 1.3–9.1)
SODIUM BLD-SCNC: 140 MMOL/L (ref 135–144)
WBC # BLD: 5.7 K/UL (ref 3.5–11)
WBC # BLD: ABNORMAL 10*3/UL

## 2018-05-18 PROCEDURE — 85025 COMPLETE CBC W/AUTO DIFF WBC: CPT

## 2018-05-18 PROCEDURE — 36415 COLL VENOUS BLD VENIPUNCTURE: CPT

## 2018-05-18 PROCEDURE — 80048 BASIC METABOLIC PNL TOTAL CA: CPT

## 2018-05-18 RX ORDER — BUPROPION HYDROCHLORIDE 75 MG/1
75 TABLET ORAL NIGHTLY
COMMUNITY

## 2018-05-29 ENCOUNTER — ANESTHESIA (OUTPATIENT)
Dept: OPERATING ROOM | Age: 53
End: 2018-05-29
Payer: MEDICARE

## 2018-05-29 ENCOUNTER — HOSPITAL ENCOUNTER (OUTPATIENT)
Age: 53
Setting detail: OUTPATIENT SURGERY
Discharge: HOME | End: 2018-05-29
Attending: SURGERY | Admitting: SURGERY
Payer: MEDICARE

## 2018-05-29 ENCOUNTER — ANESTHESIA EVENT (OUTPATIENT)
Dept: OPERATING ROOM | Age: 53
End: 2018-05-29
Payer: MEDICARE

## 2018-05-29 VITALS
OXYGEN SATURATION: 95 % | TEMPERATURE: 95.7 F | DIASTOLIC BLOOD PRESSURE: 61 MMHG | SYSTOLIC BLOOD PRESSURE: 101 MMHG | RESPIRATION RATE: 13 BRPM

## 2018-05-29 VITALS
RESPIRATION RATE: 16 BRPM | DIASTOLIC BLOOD PRESSURE: 71 MMHG | TEMPERATURE: 97 F | HEART RATE: 67 BPM | HEIGHT: 72 IN | BODY MASS INDEX: 39.68 KG/M2 | SYSTOLIC BLOOD PRESSURE: 112 MMHG | WEIGHT: 293 LBS | OXYGEN SATURATION: 93 %

## 2018-05-29 DIAGNOSIS — K42.9 UMBILICAL HERNIA WITHOUT OBSTRUCTION AND WITHOUT GANGRENE: Primary | ICD-10-CM

## 2018-05-29 PROCEDURE — 2580000003 HC RX 258: Performed by: ANESTHESIOLOGY

## 2018-05-29 PROCEDURE — 3600000012 HC SURGERY LEVEL 2 ADDTL 15MIN: Performed by: SURGERY

## 2018-05-29 PROCEDURE — 2500000003 HC RX 250 WO HCPCS: Performed by: NURSE ANESTHETIST, CERTIFIED REGISTERED

## 2018-05-29 PROCEDURE — C1781 MESH (IMPLANTABLE): HCPCS | Performed by: SURGERY

## 2018-05-29 PROCEDURE — 6360000002 HC RX W HCPCS: Performed by: NURSE ANESTHETIST, CERTIFIED REGISTERED

## 2018-05-29 PROCEDURE — 7100000031 HC ASPR PHASE II RECOVERY - ADDTL 15 MIN: Performed by: SURGERY

## 2018-05-29 PROCEDURE — 3700000000 HC ANESTHESIA ATTENDED CARE: Performed by: SURGERY

## 2018-05-29 PROCEDURE — 7100000001 HC PACU RECOVERY - ADDTL 15 MIN: Performed by: SURGERY

## 2018-05-29 PROCEDURE — A6402 STERILE GAUZE <= 16 SQ IN: HCPCS | Performed by: SURGERY

## 2018-05-29 PROCEDURE — 6360000002 HC RX W HCPCS: Performed by: SURGERY

## 2018-05-29 PROCEDURE — 3700000001 HC ADD 15 MINUTES (ANESTHESIA): Performed by: SURGERY

## 2018-05-29 PROCEDURE — 6370000000 HC RX 637 (ALT 250 FOR IP): Performed by: ANESTHESIOLOGY

## 2018-05-29 PROCEDURE — 7100000000 HC PACU RECOVERY - FIRST 15 MIN: Performed by: SURGERY

## 2018-05-29 PROCEDURE — 2500000003 HC RX 250 WO HCPCS: Performed by: SURGERY

## 2018-05-29 PROCEDURE — 7100000030 HC ASPR PHASE II RECOVERY - FIRST 15 MIN: Performed by: SURGERY

## 2018-05-29 PROCEDURE — 3600000002 HC SURGERY LEVEL 2 BASE: Performed by: SURGERY

## 2018-05-29 DEVICE — IMPLANTABLE DEVICE: Type: IMPLANTABLE DEVICE | Site: UMBILICAL | Status: FUNCTIONAL

## 2018-05-29 RX ORDER — HYDROCODONE BITARTRATE AND ACETAMINOPHEN 5; 325 MG/1; MG/1
1 TABLET ORAL PRN
Status: COMPLETED | OUTPATIENT
Start: 2018-05-29 | End: 2018-05-29

## 2018-05-29 RX ORDER — FENTANYL CITRATE 50 UG/ML
25 INJECTION, SOLUTION INTRAMUSCULAR; INTRAVENOUS EVERY 5 MIN PRN
Status: DISCONTINUED | OUTPATIENT
Start: 2018-05-29 | End: 2018-05-29 | Stop reason: HOSPADM

## 2018-05-29 RX ORDER — DIPHENHYDRAMINE HYDROCHLORIDE 50 MG/ML
12.5 INJECTION INTRAMUSCULAR; INTRAVENOUS
Status: DISCONTINUED | OUTPATIENT
Start: 2018-05-29 | End: 2018-05-29 | Stop reason: HOSPADM

## 2018-05-29 RX ORDER — LABETALOL HYDROCHLORIDE 5 MG/ML
5 INJECTION, SOLUTION INTRAVENOUS EVERY 10 MIN PRN
Status: DISCONTINUED | OUTPATIENT
Start: 2018-05-29 | End: 2018-05-29 | Stop reason: HOSPADM

## 2018-05-29 RX ORDER — GLYCOPYRROLATE 1 MG/5 ML
SYRINGE (ML) INTRAVENOUS PRN
Status: DISCONTINUED | OUTPATIENT
Start: 2018-05-29 | End: 2018-05-29 | Stop reason: SDUPTHER

## 2018-05-29 RX ORDER — SODIUM CHLORIDE, SODIUM LACTATE, POTASSIUM CHLORIDE, CALCIUM CHLORIDE 600; 310; 30; 20 MG/100ML; MG/100ML; MG/100ML; MG/100ML
INJECTION, SOLUTION INTRAVENOUS CONTINUOUS
Status: DISCONTINUED | OUTPATIENT
Start: 2018-05-29 | End: 2018-05-29 | Stop reason: HOSPADM

## 2018-05-29 RX ORDER — ROCURONIUM BROMIDE 10 MG/ML
INJECTION, SOLUTION INTRAVENOUS PRN
Status: DISCONTINUED | OUTPATIENT
Start: 2018-05-29 | End: 2018-05-29 | Stop reason: SDUPTHER

## 2018-05-29 RX ORDER — ONDANSETRON 4 MG/1
TABLET, FILM COATED ORAL
Qty: 20 TABLET | Refills: 0 | Status: SHIPPED | OUTPATIENT
Start: 2018-05-29

## 2018-05-29 RX ORDER — HYDRALAZINE HYDROCHLORIDE 20 MG/ML
5 INJECTION INTRAMUSCULAR; INTRAVENOUS EVERY 10 MIN PRN
Status: DISCONTINUED | OUTPATIENT
Start: 2018-05-29 | End: 2018-05-29 | Stop reason: HOSPADM

## 2018-05-29 RX ORDER — OXYCODONE HYDROCHLORIDE AND ACETAMINOPHEN 5; 325 MG/1; MG/1
1 TABLET ORAL EVERY 6 HOURS PRN
Qty: 28 TABLET | Refills: 0 | Status: SHIPPED | OUTPATIENT
Start: 2018-05-29 | End: 2018-06-05

## 2018-05-29 RX ORDER — MIDAZOLAM HYDROCHLORIDE 1 MG/ML
INJECTION INTRAMUSCULAR; INTRAVENOUS PRN
Status: DISCONTINUED | OUTPATIENT
Start: 2018-05-29 | End: 2018-05-29 | Stop reason: SDUPTHER

## 2018-05-29 RX ORDER — NEOSTIGMINE METHYLSULFATE 5 MG/5 ML
SYRINGE (ML) INTRAVENOUS PRN
Status: DISCONTINUED | OUTPATIENT
Start: 2018-05-29 | End: 2018-05-29 | Stop reason: SDUPTHER

## 2018-05-29 RX ORDER — BUPIVACAINE HYDROCHLORIDE 2.5 MG/ML
INJECTION, SOLUTION EPIDURAL; INFILTRATION; INTRACAUDAL PRN
Status: DISCONTINUED | OUTPATIENT
Start: 2018-05-29 | End: 2018-05-29 | Stop reason: HOSPADM

## 2018-05-29 RX ORDER — DEXAMETHASONE SODIUM PHOSPHATE 4 MG/ML
INJECTION, SOLUTION INTRA-ARTICULAR; INTRALESIONAL; INTRAMUSCULAR; INTRAVENOUS; SOFT TISSUE PRN
Status: DISCONTINUED | OUTPATIENT
Start: 2018-05-29 | End: 2018-05-29 | Stop reason: SDUPTHER

## 2018-05-29 RX ORDER — METOCLOPRAMIDE HYDROCHLORIDE 5 MG/ML
10 INJECTION INTRAMUSCULAR; INTRAVENOUS
Status: DISCONTINUED | OUTPATIENT
Start: 2018-05-29 | End: 2018-05-29 | Stop reason: HOSPADM

## 2018-05-29 RX ORDER — MEPERIDINE HYDROCHLORIDE 50 MG/ML
12.5 INJECTION INTRAMUSCULAR; INTRAVENOUS; SUBCUTANEOUS EVERY 5 MIN PRN
Status: DISCONTINUED | OUTPATIENT
Start: 2018-05-29 | End: 2018-05-29 | Stop reason: HOSPADM

## 2018-05-29 RX ORDER — CEPHALEXIN 500 MG/1
CAPSULE ORAL
Qty: 21 CAPSULE | Refills: 0 | Status: SHIPPED | OUTPATIENT
Start: 2018-05-29

## 2018-05-29 RX ORDER — FENTANYL CITRATE 50 UG/ML
50 INJECTION, SOLUTION INTRAMUSCULAR; INTRAVENOUS EVERY 5 MIN PRN
Status: DISCONTINUED | OUTPATIENT
Start: 2018-05-29 | End: 2018-05-29 | Stop reason: HOSPADM

## 2018-05-29 RX ORDER — MORPHINE SULFATE 2 MG/ML
2 INJECTION, SOLUTION INTRAMUSCULAR; INTRAVENOUS EVERY 5 MIN PRN
Status: DISCONTINUED | OUTPATIENT
Start: 2018-05-29 | End: 2018-05-29 | Stop reason: HOSPADM

## 2018-05-29 RX ORDER — LIDOCAINE HYDROCHLORIDE 20 MG/ML
INJECTION, SOLUTION INFILTRATION; PERINEURAL PRN
Status: DISCONTINUED | OUTPATIENT
Start: 2018-05-29 | End: 2018-05-29 | Stop reason: SDUPTHER

## 2018-05-29 RX ORDER — PROPOFOL 10 MG/ML
INJECTION, EMULSION INTRAVENOUS PRN
Status: DISCONTINUED | OUTPATIENT
Start: 2018-05-29 | End: 2018-05-29 | Stop reason: SDUPTHER

## 2018-05-29 RX ORDER — ONDANSETRON 2 MG/ML
INJECTION INTRAMUSCULAR; INTRAVENOUS PRN
Status: DISCONTINUED | OUTPATIENT
Start: 2018-05-29 | End: 2018-05-29 | Stop reason: SDUPTHER

## 2018-05-29 RX ORDER — CEFAZOLIN SODIUM 1 G/3ML
3 INJECTION, POWDER, FOR SOLUTION INTRAMUSCULAR; INTRAVENOUS ONCE
Status: COMPLETED | OUTPATIENT
Start: 2018-05-29 | End: 2018-05-29

## 2018-05-29 RX ORDER — ONDANSETRON 2 MG/ML
4 INJECTION INTRAMUSCULAR; INTRAVENOUS
Status: DISCONTINUED | OUTPATIENT
Start: 2018-05-29 | End: 2018-05-29 | Stop reason: HOSPADM

## 2018-05-29 RX ORDER — HYDROCODONE BITARTRATE AND ACETAMINOPHEN 5; 325 MG/1; MG/1
2 TABLET ORAL PRN
Status: COMPLETED | OUTPATIENT
Start: 2018-05-29 | End: 2018-05-29

## 2018-05-29 RX ORDER — FENTANYL CITRATE 50 UG/ML
INJECTION, SOLUTION INTRAMUSCULAR; INTRAVENOUS PRN
Status: DISCONTINUED | OUTPATIENT
Start: 2018-05-29 | End: 2018-05-29 | Stop reason: SDUPTHER

## 2018-05-29 RX ADMIN — SODIUM CHLORIDE, POTASSIUM CHLORIDE, SODIUM LACTATE AND CALCIUM CHLORIDE: 600; 310; 30; 20 INJECTION, SOLUTION INTRAVENOUS at 07:45

## 2018-05-29 RX ADMIN — ROCURONIUM BROMIDE 40 MG: 10 INJECTION INTRAVENOUS at 09:07

## 2018-05-29 RX ADMIN — HYDROCODONE BITARTRATE AND ACETAMINOPHEN 2 TABLET: 5; 325 TABLET ORAL at 11:17

## 2018-05-29 RX ADMIN — SODIUM CHLORIDE, POTASSIUM CHLORIDE, SODIUM LACTATE AND CALCIUM CHLORIDE: 600; 310; 30; 20 INJECTION, SOLUTION INTRAVENOUS at 09:57

## 2018-05-29 RX ADMIN — CEFAZOLIN 3 G: 1 INJECTION, POWDER, FOR SOLUTION INTRAMUSCULAR; INTRAVENOUS at 09:02

## 2018-05-29 RX ADMIN — MIDAZOLAM 1 MG: 1 INJECTION INTRAMUSCULAR; INTRAVENOUS at 09:04

## 2018-05-29 RX ADMIN — MIDAZOLAM 1 MG: 1 INJECTION INTRAMUSCULAR; INTRAVENOUS at 09:02

## 2018-05-29 RX ADMIN — PROPOFOL 150 MG: 10 INJECTION, EMULSION INTRAVENOUS at 09:07

## 2018-05-29 RX ADMIN — DEXAMETHASONE SODIUM PHOSPHATE 4 MG: 4 INJECTION, SOLUTION INTRAMUSCULAR; INTRAVENOUS at 09:45

## 2018-05-29 RX ADMIN — FENTANYL CITRATE 100 MCG: 50 INJECTION, SOLUTION INTRAMUSCULAR; INTRAVENOUS at 09:07

## 2018-05-29 RX ADMIN — ONDANSETRON 4 MG: 2 INJECTION INTRAMUSCULAR; INTRAVENOUS at 09:45

## 2018-05-29 RX ADMIN — PHENYLEPHRINE HYDROCHLORIDE 100 MCG: 10 INJECTION INTRAVENOUS at 09:19

## 2018-05-29 RX ADMIN — LIDOCAINE HYDROCHLORIDE 60 MG: 20 INJECTION, SOLUTION INFILTRATION; PERINEURAL at 09:07

## 2018-05-29 RX ADMIN — PHENYLEPHRINE HYDROCHLORIDE 100 MCG: 10 INJECTION INTRAVENOUS at 09:17

## 2018-05-29 RX ADMIN — Medication 4 MG: at 09:55

## 2018-05-29 RX ADMIN — Medication 0.6 MG: at 09:55

## 2018-05-29 ASSESSMENT — PULMONARY FUNCTION TESTS
PIF_VALUE: 22
PIF_VALUE: 21
PIF_VALUE: 32
PIF_VALUE: 1
PIF_VALUE: 12
PIF_VALUE: 20
PIF_VALUE: 27
PIF_VALUE: 21
PIF_VALUE: 23
PIF_VALUE: 21
PIF_VALUE: 21
PIF_VALUE: 1
PIF_VALUE: 23
PIF_VALUE: 20
PIF_VALUE: 21
PIF_VALUE: 25
PIF_VALUE: 30
PIF_VALUE: 21
PIF_VALUE: 9
PIF_VALUE: 22
PIF_VALUE: 1
PIF_VALUE: 21
PIF_VALUE: 23
PIF_VALUE: 1
PIF_VALUE: 21
PIF_VALUE: 20
PIF_VALUE: 22
PIF_VALUE: 21
PIF_VALUE: 22
PIF_VALUE: 21
PIF_VALUE: 20
PIF_VALUE: 21
PIF_VALUE: 23
PIF_VALUE: 21
PIF_VALUE: 22
PIF_VALUE: 21
PIF_VALUE: 22
PIF_VALUE: 24
PIF_VALUE: 21
PIF_VALUE: 21
PIF_VALUE: 22
PIF_VALUE: 21
PIF_VALUE: 1
PIF_VALUE: 22
PIF_VALUE: 22
PIF_VALUE: 21
PIF_VALUE: 21
PIF_VALUE: 22

## 2018-05-29 ASSESSMENT — PAIN DESCRIPTION - ONSET: ONSET: GRADUAL

## 2018-05-29 ASSESSMENT — PAIN DESCRIPTION - PROGRESSION: CLINICAL_PROGRESSION: GRADUALLY WORSENING

## 2018-05-29 ASSESSMENT — PAIN DESCRIPTION - PAIN TYPE
TYPE: SURGICAL PAIN

## 2018-05-29 ASSESSMENT — PAIN DESCRIPTION - ORIENTATION
ORIENTATION: ANTERIOR;MID
ORIENTATION: MID
ORIENTATION: ANTERIOR;MID
ORIENTATION: ANTERIOR;MID

## 2018-05-29 ASSESSMENT — PAIN DESCRIPTION - DESCRIPTORS
DESCRIPTORS: ACHING;CONSTANT
DESCRIPTORS: BURNING;SHARP

## 2018-05-29 ASSESSMENT — PAIN SCALES - GENERAL
PAINLEVEL_OUTOF10: 5
PAINLEVEL_OUTOF10: 7
PAINLEVEL_OUTOF10: 4
PAINLEVEL_OUTOF10: 6
PAINLEVEL_OUTOF10: 5
PAINLEVEL_OUTOF10: 7
PAINLEVEL_OUTOF10: 2
PAINLEVEL_OUTOF10: 0

## 2018-05-29 ASSESSMENT — PAIN - FUNCTIONAL ASSESSMENT: PAIN_FUNCTIONAL_ASSESSMENT: 0-10

## 2018-05-29 ASSESSMENT — PAIN DESCRIPTION - LOCATION
LOCATION: ABDOMEN;UMBILICUS
LOCATION: ABDOMEN;UMBILICUS
LOCATION: ABDOMEN
LOCATION: ABDOMEN;UMBILICUS

## 2018-05-29 ASSESSMENT — PAIN DESCRIPTION - FREQUENCY: FREQUENCY: CONTINUOUS

## 2024-11-16 ENCOUNTER — HOSPITAL ENCOUNTER (EMERGENCY)
Age: 59
Discharge: HOME | End: 2024-11-16
Attending: EMERGENCY MEDICINE
Payer: MEDICARE

## 2024-11-16 ENCOUNTER — APPOINTMENT (OUTPATIENT)
Dept: CT IMAGING | Age: 59
End: 2024-11-16
Payer: MEDICARE

## 2024-11-16 ENCOUNTER — APPOINTMENT (OUTPATIENT)
Dept: GENERAL RADIOLOGY | Age: 59
End: 2024-11-16
Payer: MEDICARE

## 2024-11-16 VITALS
DIASTOLIC BLOOD PRESSURE: 92 MMHG | RESPIRATION RATE: 18 BRPM | OXYGEN SATURATION: 96 % | WEIGHT: 310 LBS | SYSTOLIC BLOOD PRESSURE: 130 MMHG | BODY MASS INDEX: 41.99 KG/M2 | TEMPERATURE: 98.6 F | HEIGHT: 72 IN | HEART RATE: 72 BPM

## 2024-11-16 DIAGNOSIS — M54.42 ACUTE LEFT-SIDED LOW BACK PAIN WITH LEFT-SIDED SCIATICA: ICD-10-CM

## 2024-11-16 DIAGNOSIS — M25.552 LEFT HIP PAIN: Primary | ICD-10-CM

## 2024-11-16 PROCEDURE — 99284 EMERGENCY DEPT VISIT MOD MDM: CPT

## 2024-11-16 PROCEDURE — 73502 X-RAY EXAM HIP UNI 2-3 VIEWS: CPT

## 2024-11-16 PROCEDURE — 6370000000 HC RX 637 (ALT 250 FOR IP): Performed by: EMERGENCY MEDICINE

## 2024-11-16 PROCEDURE — 96372 THER/PROPH/DIAG INJ SC/IM: CPT

## 2024-11-16 PROCEDURE — 72131 CT LUMBAR SPINE W/O DYE: CPT

## 2024-11-16 PROCEDURE — 6360000002 HC RX W HCPCS: Performed by: EMERGENCY MEDICINE

## 2024-11-16 RX ORDER — IBUPROFEN 600 MG/1
600 TABLET, FILM COATED ORAL 3 TIMES DAILY PRN
Qty: 30 TABLET | Refills: 0 | Status: SHIPPED | OUTPATIENT
Start: 2024-11-16

## 2024-11-16 RX ORDER — TIZANIDINE 2 MG/1
2 TABLET ORAL EVERY 6 HOURS PRN
Qty: 20 TABLET | Refills: 0 | Status: SHIPPED | OUTPATIENT
Start: 2024-11-16

## 2024-11-16 RX ORDER — HYDROCODONE BITARTRATE AND ACETAMINOPHEN 5; 325 MG/1; MG/1
1 TABLET ORAL ONCE
Status: COMPLETED | OUTPATIENT
Start: 2024-11-16 | End: 2024-11-16

## 2024-11-16 RX ORDER — LIDOCAINE 4 G/G
1 PATCH TOPICAL ONCE
Status: DISCONTINUED | OUTPATIENT
Start: 2024-11-16 | End: 2024-11-16 | Stop reason: HOSPADM

## 2024-11-16 RX ORDER — KETOROLAC TROMETHAMINE 30 MG/ML
30 INJECTION, SOLUTION INTRAMUSCULAR; INTRAVENOUS ONCE
Status: COMPLETED | OUTPATIENT
Start: 2024-11-16 | End: 2024-11-16

## 2024-11-16 RX ORDER — LIDOCAINE 50 MG/G
1 PATCH TOPICAL DAILY
Qty: 10 PATCH | Refills: 0 | Status: SHIPPED | OUTPATIENT
Start: 2024-11-16 | End: 2024-11-26

## 2024-11-16 RX ORDER — ORPHENADRINE CITRATE 100 MG/1
100 TABLET ORAL ONCE
Status: COMPLETED | OUTPATIENT
Start: 2024-11-16 | End: 2024-11-16

## 2024-11-16 RX ORDER — HYDROCODONE BITARTRATE AND ACETAMINOPHEN 5; 325 MG/1; MG/1
1 TABLET ORAL EVERY 6 HOURS PRN
Qty: 10 TABLET | Refills: 0 | Status: SHIPPED | OUTPATIENT
Start: 2024-11-16 | End: 2024-11-19

## 2024-11-16 RX ADMIN — HYDROCODONE BITARTRATE AND ACETAMINOPHEN 1 TABLET: 5; 325 TABLET ORAL at 16:21

## 2024-11-16 RX ADMIN — KETOROLAC TROMETHAMINE 30 MG: 30 INJECTION, SOLUTION INTRAMUSCULAR at 16:21

## 2024-11-16 RX ADMIN — ORPHENADRINE CITRATE 100 MG: 100 TABLET, EXTENDED RELEASE ORAL at 16:21

## 2024-11-16 ASSESSMENT — ENCOUNTER SYMPTOMS
BACK PAIN: 1
COLOR CHANGE: 0
ABDOMINAL PAIN: 0
EYE PAIN: 0
SHORTNESS OF BREATH: 0

## 2024-11-16 ASSESSMENT — PAIN - FUNCTIONAL ASSESSMENT
PAIN_FUNCTIONAL_ASSESSMENT: 0-10
PAIN_FUNCTIONAL_ASSESSMENT: PREVENTS OR INTERFERES SOME ACTIVE ACTIVITIES AND ADLS
PAIN_FUNCTIONAL_ASSESSMENT: PREVENTS OR INTERFERES SOME ACTIVE ACTIVITIES AND ADLS

## 2024-11-16 ASSESSMENT — PAIN DESCRIPTION - LOCATION
LOCATION: BUTTOCKS;BACK
LOCATION: BUTTOCKS;HIP
LOCATION: HIP;LEG

## 2024-11-16 ASSESSMENT — LIFESTYLE VARIABLES
HOW MANY STANDARD DRINKS CONTAINING ALCOHOL DO YOU HAVE ON A TYPICAL DAY: PATIENT DOES NOT DRINK
HOW OFTEN DO YOU HAVE A DRINK CONTAINING ALCOHOL: NEVER

## 2024-11-16 ASSESSMENT — PAIN DESCRIPTION - DESCRIPTORS
DESCRIPTORS: SHOOTING;SHARP
DESCRIPTORS: SHARP

## 2024-11-16 ASSESSMENT — PAIN DESCRIPTION - ORIENTATION
ORIENTATION: LEFT

## 2024-11-16 ASSESSMENT — PAIN SCALES - GENERAL
PAINLEVEL_OUTOF10: 2
PAINLEVEL_OUTOF10: 3
PAINLEVEL_OUTOF10: 8

## 2024-11-16 ASSESSMENT — PAIN DESCRIPTION - PAIN TYPE: TYPE: ACUTE PAIN

## 2024-11-16 NOTE — ED PROVIDER NOTES
EMERGENCY DEPARTMENT ENCOUNTER    Pt Name: Zane Weir  MRN: [id number]  Birthdate 1965  Date of evaluation: 11/16/24  CHIEF COMPLAINT       Chief Complaint   Patient presents with    Hip Pain     Left hip and leg pain, states he has arthritis and was out doing yard work a few days ago and has been having increased pain since then .     HISTORY OF PRESENT ILLNESS   59-year-old male presents for left-sided back and hip pain.  Reports that he was doing yard work the other day, he states after that he noted that he was developing some left-sided hip and back pain.  He states that has been getting worse since that time.  Describes as an aching pain worse with movements, he denies any new numbness tingling or weakness to the extremities.  He denies any bowel or bladder incontinence or saddle paresthesias.    The history is provided by the patient.           REVIEW OF SYSTEMS     Review of Systems   Constitutional:  Negative for chills and fever.   HENT:  Negative for congestion and ear pain.    Eyes:  Negative for pain.   Respiratory:  Negative for shortness of breath.    Cardiovascular:  Negative for chest pain, palpitations and leg swelling.   Gastrointestinal:  Negative for abdominal pain.   Genitourinary:  Negative for dysuria and flank pain.   Musculoskeletal:  Positive for back pain.        Left hip pain   Skin:  Negative for color change.   Neurological:  Negative for numbness and headaches.   Psychiatric/Behavioral:  Negative for confusion.    All other systems reviewed and are negative.    PASTMEDICAL HISTORY     Past Medical History:   Diagnosis Date    Brain bleed (HCC) 2014    Cerebral artery occlusion with cerebral infarction (HCC)     x2    Hyperlipidemia     Hypertension     MI (mitral incompetence)     Sepsis ([facility])     2014     Past Problem List  Patient Active Problem List   Diagnosis Code    Chest pain R07.9    Hypertension I10    Pacemaker Z95.0    Abdominal pain R10.9    Symptomatic cholelithiasis  K80.20     SURGICAL HISTORY       Past Surgical History:   Procedure Laterality Date    BRAIN SURGERY  2014    not sure but was after a brain bleed    CARDIAC PACEMAKER PLACEMENT      CARDIAC VALVE REPLACEMENT      pig valve    CHOLECYSTECTOMY      PACEMAKER PLACEMENT  2014    RI LAP,CHOLECYSTECTOMY N/A 2/13/2018    CHOLECYSTECTOMY LAPAROSCOPIC ROBOTIC performed by Ramirez Turner MD at [facility] OR    REPAIR UMBILICAL JAIRO,5+Y/O,REDUC N/A 5/29/2018    OPEN HERNIA UMBILICAL REPAIR W/MESH performed by Ramirez Turner MD at [facility] OR    TURP       CURRENT MEDICATIONS       Discharge Medication List as of 11/16/2024  5:17 PM        CONTINUE these medications which have NOT CHANGED    Details   cephALEXin (KEFLEX) 500 MG capsule 500 mgTake three times daily, Disp-21 capsule, R-0Print      ondansetron (ZOFRAN) 4 MG tablet Take every six hours as needed, Disp-20 tablet, R-0Print      buPROPion (WELLBUTRIN) 75 MG tablet Take 75 mg by mouth nightly Not sure of doseHistorical Med      atorvastatin (LIPITOR) 40 MG tablet Take 1 tablet by mouth nightly, Disp-30 tablet, R-3Normal      metoprolol tartrate (LOPRESSOR) 25 MG tablet Take 1 tablet by mouth 2 times daily, Disp-60 tablet, R-3Normal      aspirin 81 MG tablet Take 81 mg by mouth dailyHistorical Med      Multiple Vitamins-Minerals (THERAPEUTIC MULTIVITAMIN-MINERALS) tablet Take 1 tablet by mouth dailyHistorical Med           ALLERGIES     is allergic to compazine [prochlorperazine maleate].  FAMILY HISTORY     has no family status information on file.      SOCIAL HISTORY       Social History     Tobacco Use    Smoking status: Former     Current packs/day: 0.00     Types: Cigarettes     Quit date: 2/12/2014     Years since quitting: 10.7    Smokeless tobacco: Never   Vaping Use    Vaping status: Never Used   Substance Use Topics    Alcohol use: Yes     Comment: rare    Drug use: No     PHYSICAL EXAM     INITIAL VITALS: BP (!) 130/92   Pulse 72   Temp 98.6 °F (37 °C) (Oral)

## 2024-11-16 NOTE — ED TRIAGE NOTES
Mode of arrival (squad #, walk in, police, etc) : walk in        Chief complaint(s): hip/leg pain        Arrival Note (brief scenario, treatment PTA, etc).: Patient states he has arthritis in bilateral hips and a few days ago he was doing yard work and it flared up, pain has been increasing with little relief from home meds. Pt reports left hip radiating down into leg.          C= \"Have you ever felt that you should Cut down on your drinking?\"  No  A= \"Have people Annoyed you by criticizing your drinking?\"  No  G= \"Have you ever felt bad or Guilty about your drinking?\"  No  E= \"Have you ever had a drink as an Eye-opener first thing in the morning to steady your nerves or to help a hangover?\"  No      Deferred []      Reason for deferring: N/A    *If yes to two or more: probable alcohol abuse.*

## 2025-04-21 ENCOUNTER — TELEPHONE (OUTPATIENT)
Age: 60
End: 2025-04-21
